# Patient Record
Sex: FEMALE | Race: WHITE | NOT HISPANIC OR LATINO | Employment: FULL TIME | URBAN - METROPOLITAN AREA
[De-identification: names, ages, dates, MRNs, and addresses within clinical notes are randomized per-mention and may not be internally consistent; named-entity substitution may affect disease eponyms.]

---

## 2018-02-14 ENCOUNTER — TRANSCRIBE ORDERS (OUTPATIENT)
Dept: ADMINISTRATIVE | Facility: HOSPITAL | Age: 23
End: 2018-02-14

## 2018-02-14 ENCOUNTER — LAB (OUTPATIENT)
Dept: LAB | Facility: MEDICAL CENTER | Age: 23
End: 2018-02-14
Payer: COMMERCIAL

## 2018-02-14 ENCOUNTER — OFFICE VISIT (OUTPATIENT)
Dept: OBGYN CLINIC | Facility: MEDICAL CENTER | Age: 23
End: 2018-02-14
Payer: COMMERCIAL

## 2018-02-14 VITALS
HEIGHT: 65 IN | WEIGHT: 199 LBS | SYSTOLIC BLOOD PRESSURE: 110 MMHG | DIASTOLIC BLOOD PRESSURE: 68 MMHG | BODY MASS INDEX: 33.15 KG/M2

## 2018-02-14 DIAGNOSIS — N94.6 DYSMENORRHEA: ICD-10-CM

## 2018-02-14 DIAGNOSIS — Z77.21 PERSONAL HISTORY OF EXPOSURE TO POTENTIALLY HAZARDOUS BODY FLUIDS: ICD-10-CM

## 2018-02-14 DIAGNOSIS — B37.2 INTERTRIGINOUS CANDIDIASIS: ICD-10-CM

## 2018-02-14 DIAGNOSIS — B37.3 VAGINAL YEAST INFECTION: ICD-10-CM

## 2018-02-14 DIAGNOSIS — N92.6 IRREGULAR MENSES: Primary | ICD-10-CM

## 2018-02-14 DIAGNOSIS — Z01.419 ENCOUNTER FOR GYNECOLOGICAL EXAMINATION WITHOUT ABNORMAL FINDING: ICD-10-CM

## 2018-02-14 PROBLEM — E66.9 OBESITY: Status: ACTIVE | Noted: 2018-02-14

## 2018-02-14 LAB — SL AMB POCT URINE HCG: NEGATIVE

## 2018-02-14 PROCEDURE — 86803 HEPATITIS C AB TEST: CPT

## 2018-02-14 PROCEDURE — 81025 URINE PREGNANCY TEST: CPT | Performed by: NURSE PRACTITIONER

## 2018-02-14 PROCEDURE — 99385 PREV VISIT NEW AGE 18-39: CPT | Performed by: NURSE PRACTITIONER

## 2018-02-14 PROCEDURE — 87491 CHLMYD TRACH DNA AMP PROBE: CPT | Performed by: NURSE PRACTITIONER

## 2018-02-14 PROCEDURE — 86592 SYPHILIS TEST NON-TREP QUAL: CPT

## 2018-02-14 PROCEDURE — 87389 HIV-1 AG W/HIV-1&-2 AB AG IA: CPT

## 2018-02-14 PROCEDURE — 87591 N.GONORRHOEAE DNA AMP PROB: CPT | Performed by: NURSE PRACTITIONER

## 2018-02-14 PROCEDURE — G0145 SCR C/V CYTO,THINLAYER,RESCR: HCPCS | Performed by: NURSE PRACTITIONER

## 2018-02-14 PROCEDURE — 87340 HEPATITIS B SURFACE AG IA: CPT

## 2018-02-14 PROCEDURE — 36415 COLL VENOUS BLD VENIPUNCTURE: CPT

## 2018-02-14 RX ORDER — NYSTATIN 100000 [USP'U]/G
POWDER TOPICAL
Qty: 15 G | Refills: 0 | Status: SHIPPED | OUTPATIENT
Start: 2018-02-14

## 2018-02-14 RX ORDER — NORGESTIMATE AND ETHINYL ESTRADIOL 7DAYSX3 LO
1 KIT ORAL DAILY
Qty: 28 TABLET | Refills: 2 | Status: SHIPPED | OUTPATIENT
Start: 2018-02-14

## 2018-02-14 RX ORDER — DIPHENOXYLATE HYDROCHLORIDE AND ATROPINE SULFATE 2.5; .025 MG/1; MG/1
1 TABLET ORAL DAILY
COMMUNITY

## 2018-02-14 RX ORDER — FLUCONAZOLE 150 MG/1
150 TABLET ORAL ONCE
Qty: 1 TABLET | Refills: 0 | Status: SHIPPED | OUTPATIENT
Start: 2018-02-14 | End: 2018-02-14

## 2018-02-14 NOTE — PROGRESS NOTES
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 and her whole schedule has been upended and she is stressed  Periods are typically regular, Q30d, x4-6 days  Heavy menstrual bleeding & dysmenorrhea - they have been this way since menarche  Sexually active with 1 Male partner, monogamous relationship x2y  Requested STI testing today  Using condoms 100% of the time  Used OrthoTriCyclen OCP in the past for [facility], stopped due to insurance changes  Desires to restart  Denies sexual concerns  Has no breast, bowel, bladder concerns  Reports vaginal itching and cottage cheese discharge  Works as a   Gynecologic History   Last pap smear: Date: 2017  History of abnormal pap smears: No (per patient, no records available for viewing)  The patient denies history of sexually transmitted disease  Gardasil vaccination series: discussed, she is unsure if she had it with PCP, we will obtain records   Contraceptive method: condoms  Self-breast exams: yes    Obstetric History  Nulligravida female  Desires conception in the next year: No       The following portions of the patient's history were reviewed and updated as appropriate: allergies, current medications, past family history, past medical history, past social history, past surgical history and problem list     At todays visit I discussed the importance of self-breast exams and awareness  We discussed the importance of exercise and healthy diet  I reviewed ASCCP guidelines for cervical cancer screening   Safe sexual practices were strongly encouraged to protect against sexually transmitted infections  We reviewed her reproductive life plan    All questions were answered to her apparent satisfaction  Risks and benefits of OCP use discussed in detail  The patient was assessed for relative and absolute contraindications to [address] [facility] [address] [facility] use  She denies cigarette smoking, high blood pressure, a history of DVT, known thrombogenic mutations, migraines with aura, breast cancer, or liver disease  Warning signs for OCP use were reviewed, including abdominal pain, chest pain, severe headache, eye pain, and leg pain  She was advised that OCPs do NOT protect against STIs and a barrier method is always recommended to prevent transmission  There are some drugs (such as certain anticonvulsants and antibiotics) that may decrease the contraceptive efficacy of OCPs, and she should call our office to confirm or use a backup method of contraception if taking these drugs  We discussed that a backup method of contraception should be used for at least 7 days following any missed pills  Compliance with daily pill taking was reinforced, along with consistent timing--she agrees  Expect irregular bleeding for the first 3 months  Return in 3 months for blood pressure check & to see how she is doing with OCPs  She expressed understanding of instructions for using OCPs and all questions were answered        ROS  General ROS: negative for chills or fever  Breast ROS: negative for breast lumps  Respiratory ROS: no cough, shortness of breath, or wheezing  Cardiovascular ROS: no chest pain or dyspnea on exertion  Gastrointestinal ROS: no abdominal pain, change in bowel habits, or black or bloody stools  Genito-Urinary ROS: positive for - change in menstrual cycle and vulvar/vaginal symptoms  negative for - dysuria or urinary frequency/urgency  Neurological ROS: negative      Objective  Vitals:    02/14/18 1315   BP: 110/68   BP Location: Left arm   Patient Position: Sitting   Weight: 90 3 kg (199 lb) Height: 5' 5" (1 651 m)     UPT = Negative    Physical Exam   Constitutional: She is oriented to person, place, and time  Vital signs are normal  She appears well-developed and well-nourished  Obese young female   Genitourinary: Uterus normal  Pelvic exam was performed with patient supine  There is no rash, tenderness or lesion on the right labia  There is no rash, tenderness or lesion on the left labia  There is erythema in the vagina  Right adnexum does not display mass and does not display tenderness  Left adnexum does not display mass and does not display tenderness  Cervix does not exhibit motion tenderness  Uterus is not tender  Genitourinary Comments: Difficult to appreciate organs on bimanual exam due to body habitus   HENT:   Head: Normocephalic and atraumatic  Neck: No thyromegaly present  Cardiovascular: Normal heart sounds  Pulmonary/Chest: Effort normal and breath sounds normal  Right breast exhibits no inverted nipple, no mass, no nipple discharge, no skin change and no tenderness  Left breast exhibits no inverted nipple, no mass, no nipple discharge, no skin change and no tenderness  Breasts are symmetrical    At the intertriginous areas of each breast, proximal to the sternal area, there is focal erythema consistent with yeast   Abdominal: Soft  Normal appearance  Musculoskeletal: Normal range of motion  Lymphadenopathy:     She has no axillary adenopathy  Neurological: She is alert and oriented to person, place, and time  Skin: Skin is warm, dry and intact  Psychiatric: She has a normal mood and affect  Her behavior is normal    Vitals reviewed  Assessment  Vaginal Candidiasis  Intertriginous Candidiasis of Breast  Desires to restart OCP        Plan  1 - Will Rx OrthoTriCyclen OCP to pharmacy  UPT is negative today, can use Quickstart method if desires  Given written instructions for starting OCP  RTO in 3mos for routine pill/BP check   Reviewed ACHES precautions with Jane Daniels today  2 - Pap with reflex to HPV (if ASCUS), no records from previous pap smear, will do today to establish her care here as new patient  3 - Desired information on donating ovum, provided  4 - STI testing  5 - Nystatin powder to breast area  6 - Diflucan PO x1 for vaginal yeast  7 - RTO 1 year for routine GYN exam  8 - Obtain PMRs from PCP, if she did not have Gardasil will start the series    All questions answered & Lovely expressed understanding      Lolita Riley

## 2018-02-15 LAB
HBV SURFACE AG SER QL: NORMAL
HCV AB SER QL: NORMAL
RPR SER QL: NORMAL

## 2018-02-16 LAB
CHLAMYDIA DNA CVX QL NAA+PROBE: NORMAL
HIV 1+2 AB+HIV1 P24 AG SERPL QL IA: NORMAL
N GONORRHOEA DNA GENITAL QL NAA+PROBE: NORMAL

## 2018-02-19 ENCOUNTER — TELEPHONE (OUTPATIENT)
Dept: OBGYN CLINIC | Facility: MEDICAL CENTER | Age: 23
End: 2018-02-19

## 2018-02-19 NOTE — TELEPHONE ENCOUNTER
----- Message from [facility] sent at 2/16/2018 12:57 PM EST -----  Please call patient regarding her NORMAL STI testing results  Thanks! Tried to call pt and VM box if full and could not leave message  Still awaiting pap results and will call when results come back and/or send card in mail

## 2018-02-23 LAB
LAB AP GYN PRIMARY INTERPRETATION: NORMAL
Lab: NORMAL
PATH INTERP SPEC-IMP: NORMAL

## 2018-02-23 NOTE — PROGRESS NOTES
Please call patient and let her know that pap smear results came back normal (+ for candida, has already been treated at office appointment)  Thank you!

## 2019-03-29 ENCOUNTER — HOSPITAL ENCOUNTER (EMERGENCY)
Facility: HOSPITAL | Age: 24
Discharge: HOME | End: 2019-03-29
Attending: EMERGENCY MEDICINE

## 2019-03-29 VITALS
HEIGHT: 65 IN | BODY MASS INDEX: 35.3 KG/M2 | TEMPERATURE: 97.2 F | HEART RATE: 86 BPM | OXYGEN SATURATION: 97 % | RESPIRATION RATE: 16 BRPM | WEIGHT: 211.86 LBS | DIASTOLIC BLOOD PRESSURE: 73 MMHG | SYSTOLIC BLOOD PRESSURE: 126 MMHG

## 2019-03-29 DIAGNOSIS — H10.212 CHEMICAL CONJUNCTIVITIS OF LEFT EYE: Primary | ICD-10-CM

## 2019-03-29 PROCEDURE — 99283 EMERGENCY DEPT VISIT LOW MDM: CPT

## 2019-03-29 RX ORDER — TOBRAMYCIN 3 MG/ML
2 SOLUTION/ DROPS OPHTHALMIC ONCE
Status: COMPLETED | OUTPATIENT
Start: 2019-03-29 | End: 2019-03-29

## 2019-03-29 RX ORDER — TETRACAINE HYDROCHLORIDE 5 MG/ML
1 SOLUTION OPHTHALMIC ONCE
Status: COMPLETED | OUTPATIENT
Start: 2019-03-29 | End: 2019-03-29

## 2019-03-29 RX ADMIN — TETRACAINE HYDROCHLORIDE 1 DROP: 5 SOLUTION OPHTHALMIC at 16:47

## 2019-03-29 RX ADMIN — TOBRAMYCIN 2 DROP: 3 SOLUTION OPHTHALMIC at 16:53

## 2019-03-29 RX ADMIN — FLUORESCEIN SODIUM 1 STRIP: 1 STRIP OPHTHALMIC at 16:47

## 2020-04-25 ENCOUNTER — HOSPITAL ENCOUNTER (EMERGENCY)
Facility: HOSPITAL | Age: 25
Discharge: HOME | End: 2020-04-25
Attending: EMERGENCY MEDICINE | Admitting: EMERGENCY MEDICINE
Payer: COMMERCIAL

## 2020-04-25 VITALS
DIASTOLIC BLOOD PRESSURE: 74 MMHG | BODY MASS INDEX: 33.32 KG/M2 | HEART RATE: 74 BPM | TEMPERATURE: 97.7 F | HEIGHT: 65 IN | RESPIRATION RATE: 18 BRPM | SYSTOLIC BLOOD PRESSURE: 138 MMHG | OXYGEN SATURATION: 98 % | WEIGHT: 200 LBS
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
## 2020-04-25 DIAGNOSIS — Z20.3 NEED FOR POST EXPOSURE PROPHYLAXIS FOR RABIES: Primary | ICD-10-CM

## 2020-04-25 PROCEDURE — 90675 RABIES VACCINE IM: CPT | Performed by: EMERGENCY MEDICINE

## 2020-04-25 PROCEDURE — 90375 RABIES IG IM/SC: CPT | Performed by: EMERGENCY MEDICINE

## 2020-04-25 PROCEDURE — 99283 EMERGENCY DEPT VISIT LOW MDM: CPT

## 2020-04-25 PROCEDURE — 96372 THER/PROPH/DIAG INJ SC/IM: CPT

## 2020-04-25 PROCEDURE — 90471 IMMUNIZATION ADMIN: CPT

## 2020-04-25 PROCEDURE — 99282 EMERGENCY DEPT VISIT SF MDM: CPT | Performed by: EMERGENCY MEDICINE

## 2020-04-25 RX ORDER — AZITHROMYCIN 250 MG/1
TABLET, FILM COATED ORAL
Qty: 6 TABLET | Refills: 0 | Status: SHIPPED | OUTPATIENT
Start: 2020-04-25 | End: 2020-04-29

## 2020-04-25 RX ORDER — AZITHROMYCIN 500 MG/1
500 TABLET, FILM COATED ORAL ONCE
Status: COMPLETED | OUTPATIENT
Start: 2020-04-25 | End: 2020-04-25

## 2020-04-25 RX ORDER — AZITHROMYCIN 250 MG/1
TABLET, FILM COATED ORAL
Qty: 6 TABLET | Refills: 0 | Status: SHIPPED | OUTPATIENT
Start: 2020-04-25 | End: 2020-04-25 | Stop reason: SDUPTHER

## 2020-04-25 RX ADMIN — RABIES VIRUS STRAIN PM-1503-3M ANTIGEN (PROPIOLACTONE INACTIVATED) AND WATER 1 ML: KIT at 14:24

## 2020-04-25 RX ADMIN — AZITHROMYCIN 500 MG: 500 TABLET, FILM COATED ORAL at 14:24

## 2020-04-25 RX ADMIN — RABIES IMMUNE GLOBULIN (HUMAN) 1800 UNITS: 300 INJECTION, SOLUTION INFILTRATION; INTRAMUSCULAR at 14:25

## 2020-04-28 ENCOUNTER — HOSPITAL ENCOUNTER (EMERGENCY)
Facility: HOSPITAL | Age: 25
Discharge: HOME | End: 2020-04-28
Attending: EMERGENCY MEDICINE | Admitting: EMERGENCY MEDICINE
Payer: COMMERCIAL

## 2020-04-28 VITALS
OXYGEN SATURATION: 97 % | HEIGHT: 65 IN | TEMPERATURE: 98.4 F | DIASTOLIC BLOOD PRESSURE: 71 MMHG | SYSTOLIC BLOOD PRESSURE: 119 MMHG | WEIGHT: 200 LBS | BODY MASS INDEX: 33.32 KG/M2 | HEART RATE: 87 BPM | RESPIRATION RATE: 18 BRPM

## 2020-04-28 DIAGNOSIS — Z23 ENCOUNTER FOR REPEAT ADMINISTRATION OF RABIES VACCINATION: Primary | ICD-10-CM

## 2020-04-28 PROCEDURE — 90471 IMMUNIZATION ADMIN: CPT

## 2020-04-28 PROCEDURE — 99282 EMERGENCY DEPT VISIT SF MDM: CPT | Performed by: PHYSICIAN ASSISTANT

## 2020-04-28 PROCEDURE — 90675 RABIES VACCINE IM: CPT | Performed by: PHYSICIAN ASSISTANT

## 2020-04-28 RX ADMIN — Medication 1 ML: at 04:26

## 2020-05-02 ENCOUNTER — HOSPITAL ENCOUNTER (EMERGENCY)
Facility: HOSPITAL | Age: 25
Discharge: HOME | End: 2020-05-02
Attending: EMERGENCY MEDICINE | Admitting: EMERGENCY MEDICINE

## 2020-05-02 VITALS
BODY MASS INDEX: 33.61 KG/M2 | HEART RATE: 76 BPM | TEMPERATURE: 98.1 F | OXYGEN SATURATION: 99 % | DIASTOLIC BLOOD PRESSURE: 69 MMHG | WEIGHT: 202 LBS | RESPIRATION RATE: 17 BRPM | SYSTOLIC BLOOD PRESSURE: 123 MMHG

## 2020-05-02 DIAGNOSIS — Z23 ENCOUNTER FOR REPEAT ADMINISTRATION OF RABIES VACCINATION: Primary | ICD-10-CM

## 2020-05-02 PROCEDURE — 90675 RABIES VACCINE IM: CPT | Performed by: PHYSICIAN ASSISTANT

## 2020-05-02 PROCEDURE — 99282 EMERGENCY DEPT VISIT SF MDM: CPT | Performed by: PHYSICIAN ASSISTANT

## 2020-05-02 PROCEDURE — 90471 IMMUNIZATION ADMIN: CPT

## 2020-05-02 RX ADMIN — RABIES VIRUS STRAIN PM-1503-3M ANTIGEN (PROPIOLACTONE INACTIVATED) AND WATER 1 ML: KIT at 21:21

## 2020-05-10 ENCOUNTER — HOSPITAL ENCOUNTER (EMERGENCY)
Facility: HOSPITAL | Age: 25
Discharge: HOME | End: 2020-05-10
Attending: EMERGENCY MEDICINE
Payer: COMMERCIAL

## 2020-05-10 VITALS
HEART RATE: 90 BPM | TEMPERATURE: 97.6 F | RESPIRATION RATE: 18 BRPM | WEIGHT: 201.94 LBS | DIASTOLIC BLOOD PRESSURE: 87 MMHG | BODY MASS INDEX: 33.6 KG/M2 | OXYGEN SATURATION: 98 % | SYSTOLIC BLOOD PRESSURE: 129 MMHG

## 2020-05-10 DIAGNOSIS — R68.89 SUSPECTED LYME DISEASE: ICD-10-CM

## 2020-05-10 DIAGNOSIS — Z23 NEED FOR PROPHYLACTIC VACCINATION AGAINST RABIES: Primary | ICD-10-CM

## 2020-05-10 PROCEDURE — 99282 EMERGENCY DEPT VISIT SF MDM: CPT | Performed by: PHYSICIAN ASSISTANT

## 2020-05-10 PROCEDURE — 90471 IMMUNIZATION ADMIN: CPT

## 2020-05-10 PROCEDURE — 86618 LYME DISEASE ANTIBODY: CPT | Performed by: PHYSICIAN ASSISTANT

## 2020-05-10 PROCEDURE — 99282 EMERGENCY DEPT VISIT SF MDM: CPT

## 2020-05-10 PROCEDURE — 36415 COLL VENOUS BLD VENIPUNCTURE: CPT | Performed by: PHYSICIAN ASSISTANT

## 2020-05-10 PROCEDURE — 90675 RABIES VACCINE IM: CPT | Performed by: PHYSICIAN ASSISTANT

## 2020-05-10 RX ADMIN — RABIES VIRUS STRAIN PM-1503-3M ANTIGEN (PROPIOLACTONE INACTIVATED) AND WATER 1 ML: KIT at 06:40

## 2020-05-11 LAB — B BURGDOR IGG+IGM SER-ACNC: <0.91 ISR (ref 0–0.9)

## 2020-09-28 ENCOUNTER — HOSPITAL ENCOUNTER (EMERGENCY)
Facility: HOSPITAL | Age: 25
Discharge: HOME | End: 2020-09-28
Attending: EMERGENCY MEDICINE | Admitting: EMERGENCY MEDICINE
Payer: COMMERCIAL

## 2020-09-28 ENCOUNTER — APPOINTMENT (EMERGENCY)
Dept: CT IMAGING | Facility: HOSPITAL | Age: 25
End: 2020-09-28
Payer: COMMERCIAL

## 2020-09-28 ENCOUNTER — APPOINTMENT (EMERGENCY)
Dept: RADIOLOGY | Facility: HOSPITAL | Age: 25
End: 2020-09-28
Payer: COMMERCIAL

## 2020-09-28 VITALS
OXYGEN SATURATION: 99 % | DIASTOLIC BLOOD PRESSURE: 57 MMHG | SYSTOLIC BLOOD PRESSURE: 108 MMHG | TEMPERATURE: 97.6 F | RESPIRATION RATE: 21 BRPM | HEART RATE: 75 BPM

## 2020-09-28 DIAGNOSIS — R07.9 CHEST PAIN: ICD-10-CM

## 2020-09-28 DIAGNOSIS — R00.2 PALPITATIONS: Primary | ICD-10-CM

## 2020-09-28 LAB
ALBUMIN SERPL BCP-MCNC: 3.7 G/DL (ref 3.5–5)
ALP SERPL-CCNC: 53 U/L (ref 46–116)
ALT SERPL W P-5'-P-CCNC: 30 U/L (ref 12–78)
ANION GAP SERPL CALCULATED.3IONS-SCNC: 7 MMOL/L (ref 4–13)
AST SERPL W P-5'-P-CCNC: 16 U/L (ref 5–45)
ATRIAL RATE: 77 BPM
BASOPHILS # BLD AUTO: 0.04 THOUSANDS/ΜL (ref 0–0.1)
BASOPHILS NFR BLD AUTO: 0 % (ref 0–1)
BILIRUB SERPL-MCNC: 0.2 MG/DL (ref 0.2–1)
BUN SERPL-MCNC: 14 MG/DL (ref 5–25)
CALCIUM SERPL-MCNC: 8.4 MG/DL (ref 8.3–10.1)
CHLORIDE SERPL-SCNC: 104 MMOL/L (ref 100–108)
CO2 SERPL-SCNC: 28 MMOL/L (ref 21–32)
CREAT SERPL-MCNC: 0.96 MG/DL (ref 0.6–1.3)
D DIMER PPP FEU-MCNC: 0.54 UG/ML FEU
EOSINOPHIL # BLD AUTO: 0.34 THOUSAND/ΜL (ref 0–0.61)
EOSINOPHIL NFR BLD AUTO: 3 % (ref 0–6)
ERYTHROCYTE [DISTWIDTH] IN BLOOD BY AUTOMATED COUNT: 12.8 % (ref 11.6–15.1)
GFR SERPL CREATININE-BSD FRML MDRD: 81 ML/MIN/1.73SQ M
GLUCOSE SERPL-MCNC: 85 MG/DL (ref 65–140)
HCG SERPL QL: NEGATIVE
HCT VFR BLD AUTO: 38.3 % (ref 34.8–46.1)
HGB BLD-MCNC: 12.8 G/DL (ref 11.5–15.4)
IMM GRANULOCYTES # BLD AUTO: 0.04 THOUSAND/UL (ref 0–0.2)
IMM GRANULOCYTES NFR BLD AUTO: 0 % (ref 0–2)
LYMPHOCYTES # BLD AUTO: 3.46 THOUSANDS/ΜL (ref 0.6–4.47)
LYMPHOCYTES NFR BLD AUTO: 35 % (ref 14–44)
MAGNESIUM SERPL-MCNC: 1.9 MG/DL (ref 1.6–2.6)
MCH RBC QN AUTO: 28.1 PG (ref 26.8–34.3)
MCHC RBC AUTO-ENTMCNC: 33.4 G/DL (ref 31.4–37.4)
MCV RBC AUTO: 84 FL (ref 82–98)
MONOCYTES # BLD AUTO: 0.77 THOUSAND/ΜL (ref 0.17–1.22)
MONOCYTES NFR BLD AUTO: 8 % (ref 4–12)
NEUTROPHILS # BLD AUTO: 5.37 THOUSANDS/ΜL (ref 1.85–7.62)
NEUTS SEG NFR BLD AUTO: 54 % (ref 43–75)
NRBC BLD AUTO-RTO: 0 /100 WBCS
P AXIS: 40 DEGREES
PHOSPHATE SERPL-MCNC: 2.5 MG/DL (ref 2.7–4.5)
PLATELET # BLD AUTO: 226 THOUSANDS/UL (ref 149–390)
PMV BLD AUTO: 11.2 FL (ref 8.9–12.7)
POTASSIUM SERPL-SCNC: 3.3 MMOL/L (ref 3.5–5.3)
PR INTERVAL: 128 MS
PROT SERPL-MCNC: 7 G/DL (ref 6.4–8.2)
QRS AXIS: 51 DEGREES
QRSD INTERVAL: 84 MS
QT INTERVAL: 374 MS
QTC INTERVAL: 423 MS
RBC # BLD AUTO: 4.55 MILLION/UL (ref 3.81–5.12)
SALICYLATES SERPL-MCNC: <3 MG/DL (ref 3–20)
SODIUM SERPL-SCNC: 139 MMOL/L (ref 136–145)
T WAVE AXIS: 23 DEGREES
T4 FREE SERPL-MCNC: 1.07 NG/DL (ref 0.76–1.46)
TROPONIN I SERPL-MCNC: <0.02 NG/ML
TSH SERPL DL<=0.05 MIU/L-ACNC: 4.9 UIU/ML (ref 0.36–3.74)
VENTRICULAR RATE: 77 BPM
WBC # BLD AUTO: 10.02 THOUSAND/UL (ref 4.31–10.16)

## 2020-09-28 PROCEDURE — 99285 EMERGENCY DEPT VISIT HI MDM: CPT

## 2020-09-28 PROCEDURE — 84100 ASSAY OF PHOSPHORUS: CPT | Performed by: EMERGENCY MEDICINE

## 2020-09-28 PROCEDURE — 85379 FIBRIN DEGRADATION QUANT: CPT | Performed by: EMERGENCY MEDICINE

## 2020-09-28 PROCEDURE — 93010 ELECTROCARDIOGRAM REPORT: CPT | Performed by: INTERNAL MEDICINE

## 2020-09-28 PROCEDURE — 96360 HYDRATION IV INFUSION INIT: CPT

## 2020-09-28 PROCEDURE — 84484 ASSAY OF TROPONIN QUANT: CPT | Performed by: EMERGENCY MEDICINE

## 2020-09-28 PROCEDURE — 80053 COMPREHEN METABOLIC PANEL: CPT | Performed by: EMERGENCY MEDICINE

## 2020-09-28 PROCEDURE — 85025 COMPLETE CBC W/AUTO DIFF WBC: CPT | Performed by: EMERGENCY MEDICINE

## 2020-09-28 PROCEDURE — G1004 CDSM NDSC: HCPCS

## 2020-09-28 PROCEDURE — G0480 DRUG TEST DEF 1-7 CLASSES: HCPCS | Performed by: EMERGENCY MEDICINE

## 2020-09-28 PROCEDURE — 71045 X-RAY EXAM CHEST 1 VIEW: CPT

## 2020-09-28 PROCEDURE — 84703 CHORIONIC GONADOTROPIN ASSAY: CPT | Performed by: EMERGENCY MEDICINE

## 2020-09-28 PROCEDURE — 93005 ELECTROCARDIOGRAM TRACING: CPT

## 2020-09-28 PROCEDURE — 99285 EMERGENCY DEPT VISIT HI MDM: CPT | Performed by: EMERGENCY MEDICINE

## 2020-09-28 PROCEDURE — 71275 CT ANGIOGRAPHY CHEST: CPT

## 2020-09-28 PROCEDURE — 96361 HYDRATE IV INFUSION ADD-ON: CPT

## 2020-09-28 PROCEDURE — 80329 ANALGESICS NON-OPIOID 1 OR 2: CPT | Performed by: EMERGENCY MEDICINE

## 2020-09-28 PROCEDURE — 84439 ASSAY OF FREE THYROXINE: CPT | Performed by: EMERGENCY MEDICINE

## 2020-09-28 PROCEDURE — 36415 COLL VENOUS BLD VENIPUNCTURE: CPT | Performed by: EMERGENCY MEDICINE

## 2020-09-28 PROCEDURE — 84443 ASSAY THYROID STIM HORMONE: CPT | Performed by: EMERGENCY MEDICINE

## 2020-09-28 PROCEDURE — 83735 ASSAY OF MAGNESIUM: CPT | Performed by: EMERGENCY MEDICINE

## 2020-09-28 RX ORDER — SODIUM CHLORIDE 9 MG/ML
3 INJECTION INTRAVENOUS
Status: DISCONTINUED | OUTPATIENT
Start: 2020-09-28 | End: 2020-09-28 | Stop reason: HOSPADM

## 2020-09-28 RX ORDER — POTASSIUM CHLORIDE 20 MEQ/1
40 TABLET, EXTENDED RELEASE ORAL ONCE
Status: COMPLETED | OUTPATIENT
Start: 2020-09-28 | End: 2020-09-28

## 2020-09-28 RX ADMIN — SODIUM CHLORIDE 1000 ML: 0.9 INJECTION, SOLUTION INTRAVENOUS at 02:01

## 2020-09-28 RX ADMIN — IOHEXOL 85 ML: 350 INJECTION, SOLUTION INTRAVENOUS at 03:15

## 2020-09-28 RX ADMIN — POTASSIUM CHLORIDE 40 MEQ: 1500 TABLET, EXTENDED RELEASE ORAL at 03:54

## 2022-12-22 RX ORDER — SODIUM CHLORIDE 9 MG/ML
125 INJECTION, SOLUTION INTRAVENOUS CONTINUOUS
OUTPATIENT
Start: 2022-12-22

## 2024-07-20 ENCOUNTER — HOSPITAL ENCOUNTER (EMERGENCY)
Facility: HOSPITAL | Age: 29
Discharge: HOME | End: 2024-07-21
Attending: EMERGENCY MEDICINE
Payer: COMMERCIAL

## 2024-07-20 DIAGNOSIS — S83.92XA LEFT KNEE SPRAIN: Primary | ICD-10-CM

## 2024-07-20 PROCEDURE — 99283 EMERGENCY DEPT VISIT LOW MDM: CPT

## 2024-07-21 ENCOUNTER — APPOINTMENT (EMERGENCY)
Dept: RADIOLOGY | Facility: HOSPITAL | Age: 29
End: 2024-07-21
Payer: COMMERCIAL

## 2024-07-21 VITALS
OXYGEN SATURATION: 98 % | HEART RATE: 82 BPM | RESPIRATION RATE: 20 BRPM | DIASTOLIC BLOOD PRESSURE: 93 MMHG | TEMPERATURE: 97.7 F | SYSTOLIC BLOOD PRESSURE: 154 MMHG

## 2024-07-21 LAB
EXT PREGNANCY TEST URINE: NEGATIVE
EXT. CONTROL: NORMAL

## 2024-07-21 PROCEDURE — 81025 URINE PREGNANCY TEST: CPT | Performed by: PHYSICIAN ASSISTANT

## 2024-07-21 PROCEDURE — 99284 EMERGENCY DEPT VISIT MOD MDM: CPT | Performed by: PHYSICIAN ASSISTANT

## 2024-07-21 PROCEDURE — 96372 THER/PROPH/DIAG INJ SC/IM: CPT

## 2024-07-21 PROCEDURE — 73564 X-RAY EXAM KNEE 4 OR MORE: CPT

## 2024-07-21 RX ORDER — KETOROLAC TROMETHAMINE 30 MG/ML
15 INJECTION, SOLUTION INTRAMUSCULAR; INTRAVENOUS ONCE
Status: COMPLETED | OUTPATIENT
Start: 2024-07-21 | End: 2024-07-21

## 2024-07-21 RX ADMIN — KETOROLAC TROMETHAMINE 15 MG: 30 INJECTION, SOLUTION INTRAMUSCULAR; INTRAVENOUS at 01:53

## 2024-07-21 NOTE — ED PROVIDER NOTES
"History  Chief Complaint   Patient presents with    Knee Injury     Patient comes in reporting L knee injury. [place] when she heard pop in knee and knees buckled. Reports unable to bare weight on L knee. Rates pain 9/10. Alternating tylenol/ asa for pain. Last medicated with ASA @1930.      Patient is a 28-year-old female with no significant past medical or surgical history that presents to the emergency department with aching persistent worsening left knee pain for approximately 1 day.  Patient denies associated symptoms.  Patient states that she was recently in [place] and reported \"twisting her left knee and heard a pop.\"  Patient states that she has difficulty with ambulation left lower extremity at this time.  Patient affirms palliative factors of Tylenol and Motrin with report of provocative factors of pressure to medial aspect of left knee.  Patient denies not effective treatment.  Patient denies fevers, chills, nausea, vomiting, diarrhea, constipation and urine symptoms.  Patient denies fall with outstretched hands and fall with headstrike.  Patient denies numbness, tingling, loss of power in any or all extremities.  Patient denies chest pain, shortness of breath, and abdominal pain.      History provided by:  Patient   used: No        Prior to Admission Medications   Prescriptions Last Dose Informant Patient Reported? Taking?   FAMOTIDINE PO   Yes No   Sig: Take by mouth if needed   Multiple Vitamin (MULTIVITAMIN PO)   Yes No   Sig: Take by mouth   Omeprazole Magnesium (PRILOSEC PO)   Yes No   Sig: Take by mouth if needed   Sod Picosulfate-Mag Ox-Cit Acd (Clenpiq) 10-3.5-12 MG-GM -GM/160ML SOLN   No No   Sig: Take 1 kit by mouth see administration instructions   multivitamin (THERAGRAN) TABS   Yes No   Sig: Take 1 tablet by mouth daily   norgestimate-ethinyl estradiol (ORTHO TRI-CYCLEN LO) " 0.18/0.215/0.25 MG-25 MCG per tablet   No No   Sig: Take 1 tablet by mouth daily   nystatin (MYCOSTATIN) powder   No No   Sig: APPLY TO RED AREA BETWEEN BREASTS 3x PER DAY UNTIL HEALING IS COMPLETE      Facility-Administered Medications: None       Past Medical History:   Diagnosis Date    Stomach ulcer     Vaginal yeast infection        Past Surgical History:   Procedure Laterality Date    COLONOSCOPY  12/2016    Dr Phoenix. Diarrhea and melena.  Scope advanced to hepatic flexure, unable to advance ascending colon due to looping.  Transverse colon, descending colon and sigmoid colon and rectum were significant for mild patchy colitis and proctitis, no pathology.    EGD  12/2016    Healing esophageal ulcer, esophagitis, biopsy showed hyperplastic squamous mucosa showing nonspecific patchy and mild chronic inflammation features consistent with GERD.    THUMB ARTHROSCOPY      UPPER GASTROINTESTINAL ENDOSCOPY         Family History   Problem Relation Age of Onset    Lung cancer Father     Prostate cancer Father     Hypothyroidism Father     Colon polyps Father      I have reviewed and agree with the history as documented.    E-Cigarette/Vaping    E-Cigarette Use Never User      E-Cigarette/Vaping Substances    Nicotine No     THC No     CBD No     Flavoring No     Other No     Unknown No      Social History     Tobacco Use    Smoking status: Never    Smokeless tobacco: Never   Vaping Use    Vaping status: Never Used   Substance Use Topics    Alcohol use: Yes     Alcohol/week: 1.0 - 2.0 standard drink of alcohol     Types: 1 - 2 Standard drinks or equivalent per week    Drug use: Yes       Review of Systems   Constitutional:  Negative for activity change, appetite change, chills and fever.   HENT:  Negative for congestion, ear pain, postnasal drip, rhinorrhea, sinus pressure, sinus pain, sore throat and tinnitus.    Eyes:  Negative for photophobia, pain and visual disturbance.   Respiratory:  Negative for cough, chest  tightness and shortness of breath.    Cardiovascular:  Negative for chest pain and palpitations.   Gastrointestinal:  Negative for abdominal pain, constipation, diarrhea, nausea and vomiting.   Genitourinary:  Negative for difficulty urinating, dysuria, flank pain, frequency, hematuria and urgency.   Musculoskeletal:  Positive for arthralgias. Negative for back pain, gait problem, neck pain and neck stiffness.   Skin:  Negative for color change, pallor and rash.   Allergic/Immunologic: Negative for environmental allergies and food allergies.   Neurological:  Negative for dizziness, seizures, syncope, weakness, numbness and headaches.   Psychiatric/Behavioral:  Negative for confusion.    All other systems reviewed and are negative.      Physical Exam  Physical Exam  Vitals and nursing note reviewed.   Constitutional:       General: She is awake.      Appearance: Normal appearance. She is well-developed and normal weight. She is not ill-appearing, toxic-appearing or diaphoretic.      Comments: /93   Pulse 82   Temp 97.7 °F (36.5 °C)   Resp 20   SpO2 98%      HENT:      Head: Normocephalic and atraumatic.      Jaw: There is normal jaw occlusion.      Right Ear: Hearing, tympanic membrane and external ear normal. No decreased hearing noted. No drainage, swelling or tenderness. No mastoid tenderness.      Left Ear: Hearing, tympanic membrane and external ear normal. No decreased hearing noted. No drainage, swelling or tenderness. No mastoid tenderness.      Nose: Nose normal.      Mouth/Throat:      Lips: Pink.      Mouth: Mucous membranes are moist.      Pharynx: Oropharynx is clear. Uvula midline.   Eyes:      General: Lids are normal. Vision grossly intact. Gaze aligned appropriately.         Right eye: No discharge.         Left eye: No discharge.      Extraocular Movements: Extraocular movements intact.      Conjunctiva/sclera: Conjunctivae normal.      Pupils: Pupils are equal, round, and reactive to  light.   Neck:      Vascular: No JVD.      Trachea: Trachea and phonation normal. No tracheal tenderness or tracheal deviation.   Cardiovascular:      Rate and Rhythm: Normal rate and regular rhythm.      Pulses: Normal pulses.           Radial pulses are 2+ on the right side and 2+ on the left side.        Dorsalis pedis pulses are 2+ on the right side and 2+ on the left side.        Posterior tibial pulses are 2+ on the right side and 2+ on the left side.      Heart sounds: Normal heart sounds.   Pulmonary:      Effort: Pulmonary effort is normal.      Breath sounds: Normal breath sounds and air entry. No stridor. No decreased breath sounds, wheezing, rhonchi or rales.   Chest:      Chest wall: No tenderness.   Abdominal:      General: Abdomen is flat. Bowel sounds are normal. There is no distension.      Palpations: Abdomen is soft. Abdomen is not rigid.      Tenderness: There is no abdominal tenderness. There is no guarding or rebound.   Musculoskeletal:         General: Normal range of motion.      Cervical back: Full passive range of motion without pain, normal range of motion and neck supple. No rigidity. No spinous process tenderness or muscular tenderness. Normal range of motion.      Left knee: Tenderness present over the medial joint line.      Comments: Passive ROM intact  Upper extremity 5/5 bilaterally  Right lower extremity 5/5, left lower extremity, hip 5/5, knee 4/5, ankle 5/5  Neurovascularly intact  No grinding or clicking of joints       Feet:      Right foot:      Toenail Condition: Right toenails are normal.      Left foot:      Toenail Condition: Left toenails are normal.   Lymphadenopathy:      Head:      Right side of head: No submental, submandibular, tonsillar, preauricular, posterior auricular or occipital adenopathy.      Left side of head: No submental, submandibular, tonsillar, preauricular, posterior auricular or occipital adenopathy.      Cervical: No cervical adenopathy.      Right  cervical: No superficial, deep or posterior cervical adenopathy.     Left cervical: No superficial, deep or posterior cervical adenopathy.   Skin:     General: Skin is warm.      Capillary Refill: Capillary refill takes less than 2 seconds.      Findings: No rash.   Neurological:      General: No focal deficit present.      Mental Status: She is alert and oriented to person, place, and time. Mental status is at baseline.      GCS: GCS eye subscore is 4. GCS verbal subscore is 5. GCS motor subscore is 6.      Sensory: No sensory deficit.      Deep Tendon Reflexes: Reflexes are normal and symmetric.      Reflex Scores:       Patellar reflexes are 2+ on the right side and 2+ on the left side.  Psychiatric:         Attention and Perception: Attention normal.         Mood and Affect: Mood normal.         Speech: Speech normal.         Behavior: Behavior normal. Behavior is cooperative.         Thought Content: Thought content normal.         Judgment: Judgment normal.         Vital Signs  ED Triage Vitals [07/21/24 0003]   Temperature Pulse Respirations Blood Pressure SpO2   97.7 °F (36.5 °C) 82 20 154/93 98 %      Temp src Heart Rate Source Patient Position - Orthostatic VS BP Location FiO2 (%)   -- -- -- -- --      Pain Score       9           Vitals:    07/21/24 0003   BP: 154/93   Pulse: 82         Visual Acuity      ED Medications  Medications   ketorolac (TORADOL) injection 15 mg (15 mg Intramuscular Given 7/21/24 0153)       Diagnostic Studies  Results Reviewed       Procedure Component Value Units Date/Time    POCT pregnancy, urine [[id number]]  (Normal) Resulted: 07/21/24 0120    Lab Status: Final result Updated: 07/21/24 0121     EXT Preg Test, Ur Negative     Control Valid                   XR knee 4+ views LEFT   ED Interpretation by Roderick Vila PA-C (07/21 0059)   No acute osseous abnormality on initial read by me.                 Procedures  Splint application    Date/Time: 7/21/2024 2:28 AM    Performed  "by: Roderick Vila PA-C  Authorized by: Roderick Vila PA-C  Universal Protocol:  Consent: Verbal consent obtained.  Risks and benefits: risks, benefits and alternatives were discussed  Consent given by: patient and parent  Time out: Immediately prior to procedure a \"time out\" was called to verify the correct patient, procedure, equipment, support staff and site/side marked as required.  Timeout called at: 7/21/2024 2:28 AM.  Patient understanding: patient states understanding of the procedure being performed  Patient consent: the patient's understanding of the procedure matches consent given  Patient identity confirmed: verbally with patient and arm band    Pre-procedure details:     Sensation:  Normal  Procedure details:     Laterality:  Left    Location:  Knee    Knee:  L knee    Supplies:  Knee immobilizer  Post-procedure details:     Pain:  Improved    Sensation:  Normal    Skin color:  Pink    Patient tolerance of procedure:  Tolerated well, no immediate complications           ED Course                                 SBIRT 22yo+      Flowsheet Row Most Recent Value   Initial Alcohol Screen: US AUDIT-C     1. How often do you have a drink containing alcohol? 2 Filed at: 07/21/2024 0003   2. How many drinks containing alcohol do you have on a typical day you are drinking?  1 Filed at: 07/21/2024 0003   3a. Male UNDER 65: How often do you have five or more drinks on one occasion? 0 Filed at: 07/21/2024 0003   3b. FEMALE Any Age, or MALE 65+: How often do you have 4 or more drinks on one occassion? 0 Filed at: 07/21/2024 0003   Audit-C Score 3 Filed at: 07/21/2024 0003   JORGE: How many times in the past year have you...    Used an illegal drug or used a prescription medication for non-medical reasons? Never Filed at: 07/21/2024 0003                      Medical Decision Making  Patient is a 28-year-old female with no significant past medical or surgical history that presents to the emergency department with aching " persistent worsening left knee pain for approximately 1 day.    Patient hemodynamically stable and afebrile  No sirs  Negative urine pregnancy  Left knee x-ray with no acute osseous abnormality on wet read  See procedure note-left knee immobilizer  Delivered Toradol in the emergency department; patient demonstrates decrease in presenting left knee pain ED symptomatology status post medication delivery  Ddx likely and not limited to ACL/PCL/MCL/LCL tear, meniscal tear, patellar dislocation, patellar tendon strain, hamstring strain  Will treat for left knee strain  Patient demonstrated mechanical understanding of ordered crutch use  Counseled patient on taking over-the-counter Profen/Motrin 100 mg every 6 hours as needed for left knee pain as tolerated  Follow-up with physical therapy  Follow-up with orthopedics as needed  Follow-up with PCP  Follow up with emergency department if symptoms persist or exacerbate  Patient demonstrates verbal understanding of all clinical laboratory and imaging findings, discharge instructions, follow-up, and verbally agrees with current treatment plan with teach back    *Due to voice recognition software, sound alike and misspelled words may be contained in the documentation*      Amount and/or Complexity of Data Reviewed  Radiology: ordered and independent interpretation performed. Decision-making details documented in ED Course.    Risk  Prescription drug management.                 Disposition  Final diagnoses:   Left knee sprain     Time reflects when diagnosis was documented in both MDM as applicable and the Disposition within this note       Time User Action Codes Description Comment    7/21/2024  1:31 AM Roderick Vila Add [S83.92XA] Left knee sprain           ED Disposition       ED Disposition   Discharge    Condition   Stable    Date/Time   Sun Jul 21, 2024 0229    Comment   Lovely Trevino discharge to home/self care.                   Follow-up Information       Follow up  With Specialties Details Why Contact Info Additional Information    Walter Adhikari,  General Practice   [address]  [address]  [facility] [address] [phone]       [facility] Emergency Department Emergency Medicine   [address] [address]  [phone] [facility] Emergency Department, [address] [place], [place], [address]    Physical Therapy at [facility] Physical Therapy   [address]  [facility] [address]  [phone] Physical Therapy at [facility], [address], [place], [place], [address]   [phone]    [facility] Surgery  As needed, for further evaluation of symptoms [address]  [facility] [address] [phone] [facility] [place], [address], [address], [place], [address] [phone]            Discharge Medication List as of 7/21/2024  2:29 AM        CONTINUE these medications which have NOT CHANGED    Details   FAMOTIDINE PO Take by mouth if needed, Historical Med      !! Multiple Vitamin (MULTIVITAMIN PO) Take by mouth, Historical Med      !! multivitamin (THERAGRAN) TABS Take 1 tablet by mouth daily, Historical Med      norgestimate-ethinyl estradiol (ORTHO TRI-CYCLEN LO) 0.18/0.215/0.25 MG-25 MCG per tablet Take 1 tablet by mouth daily, Starting Wed 2/14/2018, Normal      nystatin (MYCOSTATIN) powder APPLY TO RED AREA BETWEEN BREASTS 3x PER DAY UNTIL HEALING IS COMPLETE, Normal      Omeprazole Magnesium (PRILOSEC PO) Take by mouth if needed, Historical Med      Sod Picosulfate-Mag Ox-Cit Acd (Clenpiq) 10-3.5-12 MG-GM -GM/160ML SOLN Take 1 kit by mouth see administration instructions, Starting Fri 11/18/2022, Normal       !! - Potential duplicate medications found. Please discuss with provider.          No discharge  procedures on file.    PDMP Review         Value Time User    PDMP Reviewed  Yes 7/21/2024  5:32 AM Roderick Vila PA-C            ED Provider  Electronically Signed by             Roderick Vila PA-C  07/21/24 0528

## 2024-07-21 NOTE — DISCHARGE INSTRUCTIONS
May use over-the-counter ibuprofen/Motrin 400 mg every 6 hours as needed for pain, not to exceed 3600 mg of ibuprofen/Motrin daily    May use over-the-counter Tylenol/acetaminophen 650 mg every 6 hours as needed for pain, not to exceed 4000 mg of Tylenol/acetaminophen daily.

## 2024-07-22 ENCOUNTER — OFFICE VISIT (OUTPATIENT)
Dept: OBGYN CLINIC | Facility: MEDICAL CENTER | Age: 29
End: 2024-07-22
Payer: COMMERCIAL

## 2024-07-22 VITALS
HEART RATE: 85 BPM | HEIGHT: 65 IN | SYSTOLIC BLOOD PRESSURE: 122 MMHG | DIASTOLIC BLOOD PRESSURE: 78 MMHG | BODY MASS INDEX: 37.61 KG/M2

## 2024-07-22 DIAGNOSIS — M23.92 INTERNAL DERANGEMENT OF LEFT KNEE: Primary | ICD-10-CM

## 2024-07-22 PROCEDURE — 99204 OFFICE O/P NEW MOD 45 MIN: CPT | Performed by: ORTHOPAEDIC SURGERY

## 2024-07-22 NOTE — PROGRESS NOTES
"Ortho Sports Medicine Knee New Patient Visit     Assesment:   28 y.o. female left knee internal derangement.    Plan:    Conservative treatment:    Ice to knee for 20 minutes at least 1-2 times daily.  OTC NSAIDS prn for pain.  Tylenol for pain.  MRI left knee ordered urgent to evaluate ACL  ED notes reviewed    Imaging:    All imaging from today was reviewed by myself and explained to the patient.       Injection:    No Injection planned at this time.      Surgery:     No surgery is recommended at this point, continue with conservative treatment plan as noted.      Follow up:    Return for Recheck after STAT MRI with Dr. Kaminski.        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      Pain is located anterior, posterior, medial, deep.  The patient rates the pain as a 4/10.  The pain has been present for 3 days.      The patient sustained an injury on 07/19/2024.  The mechanism of injury was a trampoline park injury with her knee \"going forward\" with pop and swelling. The pain is characterized as dull, achy.  The pain is present weekly.      Pain is improved by rest.  Pain is aggravated by weight bearing.    Symptoms include clicking.     The patient has tried rest and ice.          Knee Surgical History:  None    Past Medical, Social and Family History:  Past Medical History:   Diagnosis Date    Stomach ulcer     Vaginal yeast infection      Past Surgical History:   Procedure Laterality Date    COLONOSCOPY  12/2016    Dr Phoenix. Diarrhea and melena.  Scope advanced to hepatic flexure, unable to advance ascending colon due to looping.  Transverse colon, descending colon and sigmoid colon and rectum were significant for mild patchy colitis and proctitis, no pathology.    EGD  12/2016    Healing esophageal " ulcer, esophagitis, biopsy showed hyperplastic squamous mucosa showing nonspecific patchy and mild chronic inflammation features consistent with GERD.    THUMB ARTHROSCOPY      UPPER GASTROINTESTINAL ENDOSCOPY       No Known Allergies  Current Outpatient Medications on File Prior to Visit   Medication Sig Dispense Refill    FAMOTIDINE PO Take by mouth if needed      Multiple Vitamin (MULTIVITAMIN PO) Take by mouth      Omeprazole Magnesium (PRILOSEC PO) Take by mouth if needed      Sod Picosulfate-Mag Ox-Cit Acd (Clenpiq) 10-3.5-12 MG-GM -GM/160ML SOLN Take 1 kit by mouth see administration instructions 160 mL 0    multivitamin (THERAGRAN) TABS Take 1 tablet by mouth daily      norgestimate-ethinyl estradiol (ORTHO TRI-CYCLEN LO) 0.18/0.215/0.25 MG-25 MCG per tablet Take 1 tablet by mouth daily 28 tablet 2    nystatin (MYCOSTATIN) powder APPLY TO RED AREA BETWEEN BREASTS 3x PER DAY UNTIL HEALING IS COMPLETE 15 g 0     No current facility-administered medications on file prior to visit.     Social History     Socioeconomic History    Marital status: Single     Spouse name: Not on file    Number of children: Not on file    Years of education: Not on file    Highest education level: Not on file   Occupational History    Occupation: practice manager   Tobacco Use    Smoking status: Never    Smokeless tobacco: Never   Vaping Use    Vaping status: Never Used   Substance and Sexual Activity    Alcohol use: Yes     Alcohol/week: 1.0 - 2.0 standard drink of alcohol     Types: 1 - 2 Standard drinks or equivalent per week    Drug use: Yes    Sexual activity: Yes     Partners: Male     Birth control/protection: Condom   Other Topics Concern    Not on file   Social History Narrative    Not on file     Social Determinants of Health     Financial Resource Strain: Not on file   Food Insecurity: Not on file   Transportation Needs: Not on file   Physical Activity: Not on file   Stress: Not on file   Social Connections: Unknown  "(6/18/2024)    Received from [facility]     How often do you feel lonely or isolated from those around you? (Adult - for ages 18 years and over): Not on file   Intimate Partner Violence: Not on file   Housing Stability: Not on file         I have reviewed the past medical, surgical, social and family history, medications and allergies as documented in the EMR.    Review of systems: ROS is negative other than that noted in the HPI.  Constitutional: Negative for fatigue and fever.   HENT: Negative for sore throat.    Respiratory: Negative for shortness of breath.    Cardiovascular: Negative for chest pain.   Gastrointestinal: Negative for abdominal pain.   Endocrine: Negative for cold intolerance and heat intolerance.   Genitourinary: Negative for flank pain.   Musculoskeletal: Negative for back pain.   Skin: Negative for rash.   Allergic/Immunologic: Negative for immunocompromised state.   Neurological: Negative for dizziness.   Psychiatric/Behavioral: Negative for agitation.      Physical Exam:    Blood pressure 122/78, pulse 85, height 5' 5\" (1.651 m), not currently breastfeeding.    General/Constitutional: NAD, well developed, well nourished  HENT: Normocephalic, atraumatic  CV: Intact distal pulses, regular rate  Resp: No respiratory distress or labored breathing  GI: Soft and non-tender   Lymphatic: No lymphadenopathy palpated  Neuro: Alert and Oriented x 3, no focal deficits  Psych: Normal mood, normal affect, normal judgement, normal behavior  Skin: Warm, dry, no rashes, no erythema      Knee Exam (focused):                RIGHT LEFT   ROM:   0-130 10-45   Palpation: Effusion negative moderate     MJL tenderness Negative Positive     LJL tenderness Negative Negative   Meniscus: Lexi Deferred due to pain Deferred due to pain       Apley's Compression Deferred due to pain    Deferred due to pain      Instability: Varus stable stable     Valgus stable Slight laxity with pain   Special " Tests: Lachman Negative Slight laxity     Posterior drawer Negative Negative     Anterior drawer Negative Slight laxity     Pivot shift not tested not tested     Dial not tested not tested   Patella: Palpation no tenderness no tenderness     Mobility 1/4 1/4     Apprehension Negative Negative   Other: Single leg 1/4 squat not tested not tested      LE NV Exam: +2 DP/PT pulses bilaterally  Sensation intact to light touch L2-S1 bilaterally     Bilateral hip ROM demonstrates no pain actively or passively    No calf tenderness to palpation bilaterally    Knee Imaging    X-rays of the left knee were reviewed, which demonstrate no acute fracture or dislocation. No significant degenerative changes.  I have reviewed the radiology report and agree with their impression.    Scribe Attestation      I,:  Pily Mcknight am acting as a scribe while in the presence of the attending physician.:       I,:  Papa Kaminski, DO personally performed the services described in this documentation    as scribed in my presence.:

## 2024-07-23 ENCOUNTER — HOSPITAL ENCOUNTER (OUTPATIENT)
Dept: MRI IMAGING | Facility: HOSPITAL | Age: 29
Discharge: HOME | End: 2024-07-23
Attending: ORTHOPAEDIC SURGERY
Payer: COMMERCIAL

## 2024-07-23 DIAGNOSIS — M23.92 INTERNAL DERANGEMENT OF LEFT KNEE: ICD-10-CM

## 2024-07-23 PROCEDURE — 73721 MRI JNT OF LWR EXTRE W/O DYE: CPT

## 2024-07-26 ENCOUNTER — OFFICE VISIT (OUTPATIENT)
Dept: OBGYN CLINIC | Facility: MEDICAL CENTER | Age: 29
End: 2024-07-26
Payer: COMMERCIAL

## 2024-07-26 VITALS — BODY MASS INDEX: 37.65 KG/M2 | WEIGHT: 226 LBS | HEIGHT: 65 IN

## 2024-07-26 DIAGNOSIS — S83.512A RUPTURE OF ANTERIOR CRUCIATE LIGAMENT OF LEFT KNEE, INITIAL ENCOUNTER: Primary | ICD-10-CM

## 2024-07-26 DIAGNOSIS — Z01.818 PRE-OP TESTING: ICD-10-CM

## 2024-07-26 PROCEDURE — 99214 OFFICE O/P EST MOD 30 MIN: CPT | Performed by: ORTHOPAEDIC SURGERY

## 2024-07-26 RX ORDER — ACETAMINOPHEN 500 MG
500 TABLET ORAL EVERY 6 HOURS PRN
COMMUNITY

## 2024-07-26 RX ORDER — CEFAZOLIN SODIUM 2 G/50ML
2000 SOLUTION INTRAVENOUS ONCE
Status: CANCELLED | OUTPATIENT
Start: 2024-08-01 | End: 2024-07-26

## 2024-07-26 RX ORDER — ACETAMINOPHEN 325 MG/1
650 TABLET ORAL EVERY 6 HOURS PRN
Status: CANCELLED | OUTPATIENT
Start: 2024-07-26

## 2024-07-26 RX ORDER — TRAMADOL HYDROCHLORIDE 50 MG/1
50 TABLET ORAL EVERY 6 HOURS PRN
Status: CANCELLED | OUTPATIENT
Start: 2024-07-26

## 2024-07-26 NOTE — PROGRESS NOTES
Ortho Sports Medicine Knee Follow Up Visit     Assesment:     28 y.o. female left knee ACL tear with possible medial meniscus posterior root tear vs inter-substance degeneration    Plan:    Conservative treatment:    Continue protected weight bearing with the use of crutches and TROM brace in place and locked straight  When at home encouraged to unlock or remove her brace to continue to work on knee ROM. Instructed that pre-op ROM is indicative of post-op ROM, the better the ROM  is before surgery with help with obtaining post-op motion  Post-op PT written  Patient has TROM brace and crutches   DME discussed polar cube  Ice to knee for 20 minutes at least 1-2 times daily.  OTC NSAIDS prn for pain.    Imaging:    All imaging from today was reviewed by myself and explained to the patient.       Injection:    No Injection planned at this time.      Surgery:     All of the risks and benefits of operative treatment were explained to the patient, as well as the risks and benefits of any alternative treatment options, including nonoperative care. The risks of surgical treatement include, but are not limited to, infection, bleeding, blood clot, neurovascular damage, need for further surgery, continued pain, cardiovascular risk, and anesthesia risk.  The patient understood this and elects to proceed forward with surgical intervention.    We will proceed forward with Left knee arthroscopy with ACL reconstruction with quadriceps autograft with possible meniscus repair vs menisectomy of menisci     Patient Denies history of blood clot. Patient denies personal or family history of bleeding or clotting disorder. Patient does not take any blood thinners. Patient denies cardiac history including none. Patient denies  high blood pressure. Patient does not  see a cardiologist. Patient Denies  current history of diabetes. Patient denies allergies to antibiotics. Patient denies history of MRSA infection. Patient Denies current tobacco  use. Discussed with patient use of narcotics for post-op pain. Patient denies history of opioid abuse.     Patient notes that she does wake up aggressive from anesthesia.     Patient is planning on going on a flight 2 weeks after surgery. Instructed that this does put her at a higher risk of developing a post-op DVT. I instructed that she may go on this flight, but counseled the importance of ankle pumped and moving all her extemities throughout the flight. We will also place the patient on ASA 325mg BID.    Medical clearance will not be obtained. Bloodwork will be obtained. EKG will not be obtained.    Follow up:    Return for 1 week post-op with HILL Reich.        Chief Complaint   Patient presents with    Left Knee - Follow-up       History of Present Illness:    The patient is returns for follow up of Left knee MRI.  Since the prior visit, She reports no improvement. She continues to ambulate with the use of crutches and TROM brace in place.  She reports that she continues to have limitations in her range of motion.  She states that the left knee continues to be unstable.    Pain is located anterior, posterior, medial.     Pain is improved by rest.  Pain is aggravated by stairs, squatting, weight bearing, walking, and pivoting on a planted foot.    Symptoms include clicking, catching, and swelling.     The patient has tried rest, ice, and NSAIDS.          Knee Surgical History:  None    Past Medical, Social and Family History:  Past Medical History:   Diagnosis Date    Stomach ulcer     Vaginal yeast infection      Past Surgical History:   Procedure Laterality Date    COLONOSCOPY  12/2016    Dr Phoenix. Diarrhea and melena.  Scope advanced to hepatic flexure, unable to advance ascending colon due to looping.  Transverse colon, descending colon and sigmoid colon and rectum were significant for mild patchy colitis and proctitis, no pathology.    EGD  12/2016    Healing esophageal ulcer, esophagitis, biopsy  showed hyperplastic squamous mucosa showing nonspecific patchy and mild chronic inflammation features consistent with GERD.    THUMB ARTHROSCOPY      UPPER GASTROINTESTINAL ENDOSCOPY       No Known Allergies  Current Outpatient Medications on File Prior to Visit   Medication Sig Dispense Refill    FAMOTIDINE PO Take by mouth if needed      Multiple Vitamin (MULTIVITAMIN PO) Take by mouth      Omeprazole Magnesium (PRILOSEC PO) Take by mouth if needed      Sod Picosulfate-Mag Ox-Cit Acd (Clenpiq) 10-3.5-12 MG-GM -GM/160ML SOLN Take 1 kit by mouth see administration instructions 160 mL 0    multivitamin (THERAGRAN) TABS Take 1 tablet by mouth daily      norgestimate-ethinyl estradiol (ORTHO TRI-CYCLEN LO) 0.18/0.215/0.25 MG-25 MCG per tablet Take 1 tablet by mouth daily 28 tablet 2    nystatin (MYCOSTATIN) powder APPLY TO RED AREA BETWEEN BREASTS 3x PER DAY UNTIL HEALING IS COMPLETE 15 g 0     No current facility-administered medications on file prior to visit.     Social History     Socioeconomic History    Marital status: Single     Spouse name: Not on file    Number of children: Not on file    Years of education: Not on file    Highest education level: Not on file   Occupational History    Occupation: practice manager   Tobacco Use    Smoking status: Never    Smokeless tobacco: Never   Vaping Use    Vaping status: Never Used   Substance and Sexual Activity    Alcohol use: Yes     Alcohol/week: 1.0 - 2.0 standard drink of alcohol     Types: 1 - 2 Standard drinks or equivalent per week    Drug use: Yes    Sexual activity: Yes     Partners: Male     Birth control/protection: Condom   Other Topics Concern    Not on file   Social History Narrative    Not on file     Social Determinants of Health     Financial Resource Strain: Not on file   Food Insecurity: Not on file   Transportation Needs: Not on file   Physical Activity: Not on file   Stress: Not on file   Social Connections: Unknown (6/18/2024)    Received from  "[facility]    Social Connections     How often do you feel lonely or isolated from those around you? (Adult - for ages 18 years and over): Not on file   Intimate Partner Violence: Not on file   Housing Stability: Not on file         I have reviewed the past medical, surgical, social and family history, medications and allergies as documented in the EMR.    Review of systems: ROS is negative other than that noted in the HPI.  Constitutional: Negative for fatigue and fever.      Physical Exam:    Height 5' 5\" (1.651 m), weight 103 kg (226 lb), not currently breastfeeding.    General/Constitutional: NAD, well developed, well nourished  HENT: Normocephalic, atraumatic  CV: Intact distal pulses, regular rate  Resp: No respiratory distress or labored breathing  GI: Soft and non-tender   Lymphatic: No lymphadenopathy palpated  Neuro: Alert and Oriented x 3, no focal deficits  Psych: Normal mood, normal affect, normal judgement, normal behavior  Skin: Warm, dry, no rashes, no erythema      Knee Exam (focused):               RIGHT LEFT   ROM:   0-130    Palpation: Effusion negative moderate     MJL tenderness Negative Positive     LJL tenderness Negative Negative   Meniscus: Lexi Negative Positive    Apley's Compression Negative Positive   Instability: Varus stable stable     Valgus stable stable   Special Tests: Lachman Negative Positive     Posterior drawer Negative Negative     Anterior drawer Negative Positive     Pivot shift not tested not tested     Dial not tested not tested   Patella: Palpation no tenderness no tenderness     Mobility 1/4 1/4     Apprehension Negative Negative   Other: Single leg 1/4 squat not tested not tested           LE NV Exam: +2 DP/PT pulses bilaterally  Sensation intact to light touch L2-S1 bilaterally    No calf tenderness to palpation bilaterally      Knee Imaging    MRI of the left knee was reviewed and demonstrates a complete midsubstance ACL tear without the pivot shift bone " contusion pattern. There is a questionable medial meniscus posterior root tear vs medial meniscus inter substance degeneration because of medial extrusion visualized.I have reviewed the radiologist report and agree with their impression.

## 2024-07-26 NOTE — LETTER
July 26, 2024     Patient: Lovely Trevino  YOB: 1995  Date of Visit: 7/26/2024      To Whom it May Concern:    Lovely Trevino is under my professional care. Lovely was seen in my office on 7/26/2024. Lovely may return to work at this time but with restrictions of utilizing crutches and a brace. She will be undergoing future Left knee surgery. She will need to be out of work for the day of surgery and remain out until her 1 week post-op appointment. At her post-op appointment her work status will be re-evaluated but she will likely be out of work for 6-8 weeks following her surgery pending her recovery. Please look to our future work notes.    If you have any questions or concerns, please don't hesitate to call.         Sincerely,          Papa Kaminski DO        CC: No Recipients

## 2024-07-26 NOTE — PRE-PROCEDURE INSTRUCTIONS
Pre-Surgery Instructions:   Medication Instructions    acetaminophen (TYLENOL) 500 mg tablet Uses PRN- OK to take day of surgery    aspirin 500 MG buffered tablet Instructed to start holding today 7/26, pt takes for pain    Bioflavonoid Products (BIOFLEX PO) Instructed to start holding today 7/26,     FAMOTIDINE PO Uses PRN- OK to take day of surgery    Multiple Vitamin (MULTIVITAMIN PO) Instructed to start holding today 7/26    Omeprazole Magnesium (PRILOSEC PO) Uses PRN- OK to take day of surgery      Medication instructions for day surgery reviewed. Please use only a sip of water to take your instructed medications. Avoid all over the counter vitamins, supplements and NSAIDS for one week prior to surgery per anesthesia guidelines. Tylenol is ok to take as needed.     You will receive a call one business day prior to surgery with an arrival time and hospital directions. If your surgery is scheduled on a Monday, the hospital will be calling you on the Friday prior to your surgery. If you have not heard from anyone by 8pm, please call the hospital supervisor through the hospital  at [phone]. ([place] [phone] or [place] [phone]).    Do not eat or drink anything after midnight the night before your surgery, including candy, mints, lifesavers, or chewing gum. Do not drink alcohol 24hrs before your surgery. Try not to smoke at least 24hrs before your surgery.       Follow the pre surgery showering instructions as listed in the “My Surgical Experience Booklet” or otherwise provided by your surgeon's office. Do not use a blade to shave the surgical area 1 week before surgery. It is okay to use a clean electric clippers up to 24 hours before surgery. Do not apply any lotions, creams, including makeup, cologne, deodorant, or perfumes after showering on the day of your surgery. Do not use dry shampoo, hair spray, hair gel, or any type of hair products.     No contact lenses, eye make-up, or  artificial eyelashes. Remove nail polish, including gel polish, and any artificial, gel, or acrylic nails if possible. Remove all jewelry including rings and body piercing jewelry.     Wear causal clothing that is easy to take on and off. Consider your type of surgery.    Keep any valuables, jewelry, piercings at home. Please bring any specially ordered equipment (sling, braces) if indicated.    Arrange for a responsible person to drive you to and from the hospital on the day of your surgery. Please confirm the visitor policy for the day of your procedure when you receive your phone call with an arrival time.     Call the surgeon's office with any new illnesses, exposures, or additional questions prior to surgery.    Please reference your “My Surgical Experience Booklet” for additional information to prepare for your upcoming surgery.

## 2024-07-27 ENCOUNTER — APPOINTMENT (OUTPATIENT)
Dept: LAB | Facility: MEDICAL CENTER | Age: 29
End: 2024-07-27
Payer: COMMERCIAL

## 2024-07-27 DIAGNOSIS — S83.512A RUPTURE OF ANTERIOR CRUCIATE LIGAMENT OF LEFT KNEE, INITIAL ENCOUNTER: ICD-10-CM

## 2024-07-27 DIAGNOSIS — Z01.818 PRE-OP TESTING: ICD-10-CM

## 2024-07-27 LAB
ANION GAP SERPL CALCULATED.3IONS-SCNC: 9 MMOL/L (ref 4–13)
BUN SERPL-MCNC: 12 MG/DL (ref 5–25)
CALCIUM SERPL-MCNC: 9.3 MG/DL (ref 8.4–10.2)
CHLORIDE SERPL-SCNC: 103 MMOL/L (ref 96–108)
CO2 SERPL-SCNC: 27 MMOL/L (ref 21–32)
CREAT SERPL-MCNC: 0.87 MG/DL (ref 0.6–1.3)
ERYTHROCYTE [DISTWIDTH] IN BLOOD BY AUTOMATED COUNT: 13.2 % (ref 11.6–15.1)
GFR SERPL CREATININE-BSD FRML MDRD: 89 ML/MIN/1.73SQ M
GLUCOSE P FAST SERPL-MCNC: 87 MG/DL (ref 65–99)
HCT VFR BLD AUTO: 42.3 % (ref 34.8–46.1)
HGB BLD-MCNC: 14 G/DL (ref 11.5–15.4)
MCH RBC QN AUTO: 28.1 PG (ref 26.8–34.3)
MCHC RBC AUTO-ENTMCNC: 33.1 G/DL (ref 31.4–37.4)
MCV RBC AUTO: 85 FL (ref 82–98)
PLATELET # BLD AUTO: 264 THOUSANDS/UL (ref 149–390)
PMV BLD AUTO: 11.1 FL (ref 8.9–12.7)
POTASSIUM SERPL-SCNC: 4.2 MMOL/L (ref 3.5–5.3)
RBC # BLD AUTO: 4.99 MILLION/UL (ref 3.81–5.12)
SODIUM SERPL-SCNC: 139 MMOL/L (ref 135–147)
WBC # BLD AUTO: 8.48 THOUSAND/UL (ref 4.31–10.16)

## 2024-07-27 PROCEDURE — 80048 BASIC METABOLIC PNL TOTAL CA: CPT

## 2024-07-27 PROCEDURE — 85027 COMPLETE CBC AUTOMATED: CPT

## 2024-07-27 PROCEDURE — 36415 COLL VENOUS BLD VENIPUNCTURE: CPT

## 2024-07-29 ENCOUNTER — ANESTHESIA EVENT (OUTPATIENT)
Age: 29
End: 2024-07-29
Payer: COMMERCIAL

## 2024-07-30 NOTE — DISCHARGE INSTR - AVS FIRST PAGE
POSTOPERATIVE INSTRUCTIONS following KNEE SURGERY    MEDICATIONS:  Resume all home medications unless otherwise instructed by your surgeon.  Pain Medication:  Oxycodone 5 mg, 1 tablet every 4 hours as needed  If you were given a regional anesthetic (nerve block), please begin taking the pain medication as soon as you get home, even if you have minimal or no pain.  DO NOT WAIT FOR THE NERVE BLOCK TO WEAR OFF.  Possible side effects include nausea, constipation, and urinary retention.  If you experience these side effects, please call our office for assistance.  Pain med refills are authorized only during office hours (8am-4pm, Mon-Fri).  Anti-Inflammatory:  Tylenol 325 mg, 1-2 tablets every 6 hours and Ibuprofen 600 mg, 1 tablet every 8 hours  Take with food.  Stop if you experience nausea, reflux, or stomach pain.  Nausea Medication:  None  Fill prescription ONLY if you expericnce severe nausea.  Blood Clot Prevention:  Aspirin 325 mg, 1 tablet twice daily for 6 weeks  Pump your foot up and down 20 times per hour while you are less mobile.    WOUND CARE:  Keep the dressing clean and dry.  Light drainage may occur the first 2 days postop.  You may remove the dressings and get the incision wet in the shower 72 hours after surgery.  DO NOT remove steri-strips or sutures.  DO NOT immerse the incision under water.  Carefully pat the incision dry.  If there is wound drainage, re-apply a fresh dry gauze dressing.  Please call our office ([phone]) if you experience either of the following:  Sudden increase in swelling, redness, or warmth at the surgical site  Excessive incisional drainage that persists beyond the 3rd day after surgery  Oral temperature greater than 101 degrees, not relieved with Tylenol  Shortness of breath, chest pain, nausea, or any other concerning symptoms    SWELLING CONTROL:  Cold Therapy:  The cold therapy device may be used either continuously or only as needed, according to your preference.   "Do not let the pad directly touch your skin.  Alternatively, apply ice (20 min on, 20 min off) as often as you feel is necessary.  Elevation:  Elevate the entire leg above heart level.  Place pillows under your ankle to keep your knee straight.  Compression:  Apply ACE wraps or a thigh-length compression stocking as needed.    RANGE OF MOTION:  You are allowed FULL RANGE OF MOTION as tolerated.    IMMOBILIZATION:  Your knee brace should be WORN AT ALL TIMES, including sleep.  Keep the brace LOCKED FOR WALKING.  You may unlock the brace while sitting or sleeping.  You may remove the brace only for showering.    ACTIVITY:   BEAR FULL WEIGHT AS TOLERATED on the operative leg.  Use crutches to assist only as needed.  Using Crutches on Stairs:  Going up, lead with your \"good\" (nonoperative) leg.  Going down, lead with your \"bad\" (operative) leg.  Use a hand rail when available.  Knee Extension:  Place a rolled towel or pillow under your ankle for 20-30 minutes 3-5 times per day.  This will help to maintain full knee extension.  Quad Sets:  Sit or lie with your knee straight.  Tighten your quadriceps (front thigh) muscle.  Hold for 3 seconds, then relax.  Repeat 20 times per hour while awake.    PHYSICAL THERAPY:  Begin therapy 3 TO 5 DAYS AFTER SURGERY.  You were given a prescription for therapy at your preoperative office visit.  If you do not have physical therapy scheduled yet, please call our office for assistance.    FOLLOW-UP APPOINTMENT:  7-10 days after surgery with:    Dr. Papa Kaminski D.O.  [facility]  [address] [place] [address], [address], [place] [address]  [phone] ([place] Office)  [phone] (After-Hours)   "

## 2024-08-01 ENCOUNTER — HOSPITAL ENCOUNTER (OUTPATIENT)
Age: 29
Setting detail: OUTPATIENT SURGERY
Discharge: HOME | End: 2024-08-01
Attending: ORTHOPAEDIC SURGERY | Admitting: ORTHOPAEDIC SURGERY
Payer: COMMERCIAL

## 2024-08-01 ENCOUNTER — ANESTHESIA (OUTPATIENT)
Age: 29
End: 2024-08-01
Payer: COMMERCIAL

## 2024-08-01 VITALS
RESPIRATION RATE: 15 BRPM | HEART RATE: 73 BPM | BODY MASS INDEX: 38.32 KG/M2 | SYSTOLIC BLOOD PRESSURE: 98 MMHG | TEMPERATURE: 98.7 F | DIASTOLIC BLOOD PRESSURE: 56 MMHG | WEIGHT: 230 LBS | OXYGEN SATURATION: 98 % | HEIGHT: 65 IN

## 2024-08-01 DIAGNOSIS — Z47.89 AFTERCARE FOLLOWING SURGERY OF THE MUSCULOSKELETAL SYSTEM: ICD-10-CM

## 2024-08-01 DIAGNOSIS — S83.512D RUPTURE OF ANTERIOR CRUCIATE LIGAMENT OF LEFT KNEE, SUBSEQUENT ENCOUNTER: Primary | ICD-10-CM

## 2024-08-01 LAB
EXT PREGNANCY TEST URINE: NEGATIVE
EXT. CONTROL: NORMAL

## 2024-08-01 PROCEDURE — C1713 ANCHOR/SCREW BN/BN,TIS/BN: HCPCS | Performed by: ORTHOPAEDIC SURGERY

## 2024-08-01 PROCEDURE — 81025 URINE PREGNANCY TEST: CPT | Performed by: ORTHOPAEDIC SURGERY

## 2024-08-01 PROCEDURE — 29888 ARTHRS AID ACL RPR/AGMNTJ: CPT | Performed by: PHYSICIAN ASSISTANT

## 2024-08-01 PROCEDURE — 29888 ARTHRS AID ACL RPR/AGMNTJ: CPT | Performed by: ORTHOPAEDIC SURGERY

## 2024-08-01 PROCEDURE — 29881 ARTHRS KNE SRG MNISECTMY M/L: CPT | Performed by: PHYSICIAN ASSISTANT

## 2024-08-01 PROCEDURE — C9290 INJ, BUPIVACAINE LIPOSOME: HCPCS | Performed by: ANESTHESIOLOGY

## 2024-08-01 PROCEDURE — 29881 ARTHRS KNE SRG MNISECTMY M/L: CPT | Performed by: ORTHOPAEDIC SURGERY

## 2024-08-01 DEVICE — FIBERTAG TIGHTROPE II ABS
Type: IMPLANTABLE DEVICE | Site: KNEE | Status: FUNCTIONAL
Brand: ARTHREX®

## 2024-08-01 DEVICE — TIGHTROPE ABS BUTTON ROUND 14MM CONCAVE
Type: IMPLANTABLE DEVICE | Site: KNEE | Status: FUNCTIONAL
Brand: ARTHREX®

## 2024-08-01 DEVICE — FIBERTAG TIGHTROPE II
Type: IMPLANTABLE DEVICE | Site: KNEE | Status: FUNCTIONAL
Brand: ARTHREX®

## 2024-08-01 RX ORDER — HYDROMORPHONE HCL/PF 1 MG/ML
0.5 SYRINGE (ML) INJECTION
Status: DISCONTINUED | OUTPATIENT
Start: 2024-08-01 | End: 2024-08-01 | Stop reason: HOSPADM

## 2024-08-01 RX ORDER — ACETAMINOPHEN 325 MG/1
650 TABLET ORAL EVERY 6 HOURS PRN
Status: DISCONTINUED | OUTPATIENT
Start: 2024-08-01 | End: 2024-08-01 | Stop reason: HOSPADM

## 2024-08-01 RX ORDER — CEFAZOLIN SODIUM 2 G/50ML
2000 SOLUTION INTRAVENOUS ONCE
Status: COMPLETED | OUTPATIENT
Start: 2024-08-01 | End: 2024-08-01

## 2024-08-01 RX ORDER — OXYCODONE HYDROCHLORIDE 5 MG/1
5 TABLET ORAL EVERY 4 HOURS PRN
Status: DISCONTINUED | OUTPATIENT
Start: 2024-08-01 | End: 2024-08-01 | Stop reason: HOSPADM

## 2024-08-01 RX ORDER — ONDANSETRON 2 MG/ML
4 INJECTION INTRAMUSCULAR; INTRAVENOUS EVERY 4 HOURS PRN
Status: DISCONTINUED | OUTPATIENT
Start: 2024-08-01 | End: 2024-08-01 | Stop reason: HOSPADM

## 2024-08-01 RX ORDER — KETAMINE HCL IN NACL, ISO-OSM 100MG/10ML
SYRINGE (ML) INJECTION AS NEEDED
Status: DISCONTINUED | OUTPATIENT
Start: 2024-08-01 | End: 2024-08-01

## 2024-08-01 RX ORDER — DEXAMETHASONE SODIUM PHOSPHATE 10 MG/ML
INJECTION, SOLUTION INTRAMUSCULAR; INTRAVENOUS AS NEEDED
Status: DISCONTINUED | OUTPATIENT
Start: 2024-08-01 | End: 2024-08-01

## 2024-08-01 RX ORDER — KETOROLAC TROMETHAMINE 30 MG/ML
INJECTION, SOLUTION INTRAMUSCULAR; INTRAVENOUS AS NEEDED
Status: DISCONTINUED | OUTPATIENT
Start: 2024-08-01 | End: 2024-08-01

## 2024-08-01 RX ORDER — TRAMADOL HYDROCHLORIDE 50 MG/1
50 TABLET ORAL EVERY 6 HOURS PRN
Status: DISCONTINUED | OUTPATIENT
Start: 2024-08-01 | End: 2024-08-01 | Stop reason: HOSPADM

## 2024-08-01 RX ORDER — ASPIRIN 81 MG/1
81 TABLET, CHEWABLE ORAL 2 TIMES DAILY
Qty: 84 TABLET | Refills: 0 | Status: SHIPPED | OUTPATIENT
Start: 2024-08-01 | End: 2024-09-12

## 2024-08-01 RX ORDER — OXYCODONE HYDROCHLORIDE 5 MG/1
5 TABLET ORAL EVERY 4 HOURS PRN
Qty: 15 TABLET | Refills: 0 | Status: SHIPPED | OUTPATIENT
Start: 2024-08-01 | End: 2024-08-11

## 2024-08-01 RX ORDER — SODIUM CHLORIDE, SODIUM LACTATE, POTASSIUM CHLORIDE, CALCIUM CHLORIDE 600; 310; 30; 20 MG/100ML; MG/100ML; MG/100ML; MG/100ML
125 INJECTION, SOLUTION INTRAVENOUS CONTINUOUS
Status: DISCONTINUED | OUTPATIENT
Start: 2024-08-01 | End: 2024-08-01 | Stop reason: HOSPADM

## 2024-08-01 RX ORDER — METOCLOPRAMIDE HYDROCHLORIDE 5 MG/ML
10 INJECTION INTRAMUSCULAR; INTRAVENOUS EVERY 4 HOURS PRN
Status: DISCONTINUED | OUTPATIENT
Start: 2024-08-01 | End: 2024-08-01 | Stop reason: HOSPADM

## 2024-08-01 RX ORDER — SODIUM CHLORIDE, SODIUM LACTATE, POTASSIUM CHLORIDE, CALCIUM CHLORIDE 600; 310; 30; 20 MG/100ML; MG/100ML; MG/100ML; MG/100ML
125 INJECTION, SOLUTION INTRAVENOUS CONTINUOUS
Status: CANCELLED | OUTPATIENT
Start: 2024-08-01

## 2024-08-01 RX ORDER — HYDROMORPHONE HCL/PF 1 MG/ML
SYRINGE (ML) INJECTION AS NEEDED
Status: DISCONTINUED | OUTPATIENT
Start: 2024-08-01 | End: 2024-08-01

## 2024-08-01 RX ORDER — OXYCODONE HYDROCHLORIDE 10 MG/1
10 TABLET ORAL EVERY 4 HOURS PRN
Status: DISCONTINUED | OUTPATIENT
Start: 2024-08-01 | End: 2024-08-01 | Stop reason: HOSPADM

## 2024-08-01 RX ORDER — PROPOFOL 10 MG/ML
INJECTION, EMULSION INTRAVENOUS AS NEEDED
Status: DISCONTINUED | OUTPATIENT
Start: 2024-08-01 | End: 2024-08-01

## 2024-08-01 RX ORDER — MIDAZOLAM HYDROCHLORIDE 2 MG/2ML
INJECTION, SOLUTION INTRAMUSCULAR; INTRAVENOUS
Status: COMPLETED | OUTPATIENT
Start: 2024-08-01 | End: 2024-08-01

## 2024-08-01 RX ORDER — FENTANYL CITRATE 50 UG/ML
INJECTION, SOLUTION INTRAMUSCULAR; INTRAVENOUS AS NEEDED
Status: DISCONTINUED | OUTPATIENT
Start: 2024-08-01 | End: 2024-08-01

## 2024-08-01 RX ORDER — LIDOCAINE HYDROCHLORIDE 10 MG/ML
INJECTION, SOLUTION EPIDURAL; INFILTRATION; INTRACAUDAL; PERINEURAL AS NEEDED
Status: DISCONTINUED | OUTPATIENT
Start: 2024-08-01 | End: 2024-08-01

## 2024-08-01 RX ORDER — ONDANSETRON 2 MG/ML
INJECTION INTRAMUSCULAR; INTRAVENOUS AS NEEDED
Status: DISCONTINUED | OUTPATIENT
Start: 2024-08-01 | End: 2024-08-01

## 2024-08-01 RX ORDER — BUPIVACAINE HYDROCHLORIDE 2.5 MG/ML
INJECTION, SOLUTION EPIDURAL; INFILTRATION; INTRACAUDAL
Status: COMPLETED | OUTPATIENT
Start: 2024-08-01 | End: 2024-08-01

## 2024-08-01 RX ORDER — FENTANYL CITRATE/PF 50 MCG/ML
50 SYRINGE (ML) INJECTION
Status: DISCONTINUED | OUTPATIENT
Start: 2024-08-01 | End: 2024-08-01 | Stop reason: HOSPADM

## 2024-08-01 RX ADMIN — CEFAZOLIN SODIUM 2000 MG: 2 SOLUTION INTRAVENOUS at 13:34

## 2024-08-01 RX ADMIN — HYDROMORPHONE HYDROCHLORIDE 0.5 MG: 1 INJECTION, SOLUTION INTRAMUSCULAR; INTRAVENOUS; SUBCUTANEOUS at 16:49

## 2024-08-01 RX ADMIN — HYDROMORPHONE HYDROCHLORIDE 0.5 MG: 1 INJECTION, SOLUTION INTRAMUSCULAR; INTRAVENOUS; SUBCUTANEOUS at 16:20

## 2024-08-01 RX ADMIN — FENTANYL CITRATE 50 MCG: 50 INJECTION, SOLUTION INTRAMUSCULAR; INTRAVENOUS at 15:58

## 2024-08-01 RX ADMIN — ACETAMINOPHEN 325MG 650 MG: 325 TABLET ORAL at 17:52

## 2024-08-01 RX ADMIN — MIDAZOLAM HYDROCHLORIDE 2 MG: 2 INJECTION, SOLUTION INTRAMUSCULAR; INTRAVENOUS at 13:19

## 2024-08-01 RX ADMIN — PROPOFOL 200 MG: 10 INJECTION, EMULSION INTRAVENOUS at 13:30

## 2024-08-01 RX ADMIN — DEXAMETHASONE SODIUM PHOSPHATE 10 MG: 10 INJECTION, SOLUTION INTRAMUSCULAR; INTRAVENOUS at 13:38

## 2024-08-01 RX ADMIN — SODIUM CHLORIDE, SODIUM LACTATE, POTASSIUM CHLORIDE, AND CALCIUM CHLORIDE: .6; .31; .03; .02 INJECTION, SOLUTION INTRAVENOUS at 13:04

## 2024-08-01 RX ADMIN — LIDOCAINE HYDROCHLORIDE 50 MG: 10 INJECTION, SOLUTION EPIDURAL; INFILTRATION; INTRACAUDAL; PERINEURAL at 13:30

## 2024-08-01 RX ADMIN — FENTANYL CITRATE 50 MCG: 50 INJECTION, SOLUTION INTRAMUSCULAR; INTRAVENOUS at 13:30

## 2024-08-01 RX ADMIN — FENTANYL CITRATE 50 MCG: 50 INJECTION, SOLUTION INTRAMUSCULAR; INTRAVENOUS at 15:56

## 2024-08-01 RX ADMIN — ONDANSETRON 4 MG: 2 INJECTION INTRAMUSCULAR; INTRAVENOUS at 13:38

## 2024-08-01 RX ADMIN — SODIUM CHLORIDE, SODIUM LACTATE, POTASSIUM CHLORIDE, AND CALCIUM CHLORIDE 125 ML/HR: .6; .31; .03; .02 INJECTION, SOLUTION INTRAVENOUS at 16:37

## 2024-08-01 RX ADMIN — FENTANYL CITRATE 50 MCG: 50 INJECTION, SOLUTION INTRAMUSCULAR; INTRAVENOUS at 14:56

## 2024-08-01 RX ADMIN — FENTANYL CITRATE 50 MCG: 50 INJECTION, SOLUTION INTRAMUSCULAR; INTRAVENOUS at 15:49

## 2024-08-01 RX ADMIN — BUPIVACAINE HYDROCHLORIDE 7 ML: 2.5 INJECTION, SOLUTION EPIDURAL; INFILTRATION; INTRACAUDAL at 13:20

## 2024-08-01 RX ADMIN — Medication 30 MG: at 15:49

## 2024-08-01 RX ADMIN — KETOROLAC TROMETHAMINE 15 MG: 30 INJECTION, SOLUTION INTRAMUSCULAR; INTRAVENOUS at 15:27

## 2024-08-01 RX ADMIN — HYDROMORPHONE HYDROCHLORIDE 0.5 MG: 1 INJECTION, SOLUTION INTRAMUSCULAR; INTRAVENOUS; SUBCUTANEOUS at 16:30

## 2024-08-01 RX ADMIN — FENTANYL CITRATE 50 MCG: 50 INJECTION INTRAMUSCULAR; INTRAVENOUS at 16:10

## 2024-08-01 RX ADMIN — OXYCODONE 5 MG: 5 TABLET ORAL at 17:12

## 2024-08-01 RX ADMIN — Medication 0.5 MG: at 14:08

## 2024-08-01 NOTE — ANESTHESIA PROCEDURE NOTES
Peripheral Block    Patient location during procedure: holding area  Start time: 8/1/2024 1:20 PM  Reason for block: at surgeon's request and post-op pain management  Staffing  Performed by: Irving Lopes MD  Authorized by: Irving Lopes MD    Preanesthetic Checklist  Completed: patient identified, IV checked, site marked, risks and benefits discussed, surgical consent, monitors and equipment checked, pre-op evaluation and timeout performed  Peripheral Block  Patient position: supine  Prep: ChloraPrep  Patient monitoring: frequent blood pressure checks, continuous pulse oximetry and heart rate  Block type: Adductor Canal  Laterality: left  Injection technique: single-shot  Procedures: ultrasound guided, Ultrasound guidance required for the procedure to increase accuracy and safety of medication placement and decrease risk of complications.  Ultrasound permanent image saved  bupivacaine (PF) (MARCAINE) 0.25 % injection 20 mL - Perineural   7 mL - 8/1/2024 1:20:00 PM  bupivacaine liposomal (EXPAREL) 1.3 % injection 20 mL - Perineural   20 mL - 8/1/2024 1:20:00 PM  midazolam (VERSED) injection 0.5 mg - Intravenous   2 mg - 8/1/2024 1:19:00 PM  Needle  Needle type: Stimuplex   Needle gauge: 20 G  Needle length: 4 in  Needle localization: anatomical landmarks and ultrasound guidance  Assessment  Injection assessment: incremental injection, frequent aspiration, injected with ease, negative aspiration, negative for heart rate change, no paresthesia on injection, no symptoms of intraneural/intravenous injection and needle tip visualized at all times  Paresthesia pain: none  Post-procedure:  site cleaned  patient tolerated the procedure well with no immediate complications

## 2024-08-01 NOTE — ANESTHESIA PREPROCEDURE EVALUATION
Procedure:  ARTHROSCOPIC RECONSTRUCTION ANTERIOR CRUCIATE LIGAMENT (ACL) WITH AUTOGRAFT- quadiceps autografts (Left: Knee)  REPAIR MENISCUS (Left: Knee)    Relevant Problems   Rheumatology   (+) Rupture of anterior cruciate ligament of left knee, initial encounter      Other   (+) Obesity        Physical Exam    Airway    Mallampati score: II  TM Distance: >3 FB  Neck ROM: full     Dental       Cardiovascular      Pulmonary      Other Findings  post-pubertal.      Anesthesia Plan  ASA Score- 2     Anesthesia Type- general with ASA Monitors.         Additional Monitors:     Airway Plan:     Comment: Plan for long acting adductor canal nerve block discussed with risks, benefits, and alternatives. Approximate 1:40,000 risk of injury to nerve, vascular structures, or surrounding tissue quoted. Patient aware that this block is intended to be motor sparing.   .       Plan Factors-Exercise tolerance (METS): >4 METS.    Chart reviewed.   Existing labs reviewed. Patient summary reviewed.                  Induction- intravenous.    Postoperative Plan- Plan for postoperative opioid use. Planned trial extubation        Informed Consent- Anesthetic plan and risks discussed with patient.  I personally reviewed this patient with the CRNA. Discussed and agreed on the Anesthesia Plan with the CRNA..

## 2024-08-01 NOTE — ANESTHESIA POSTPROCEDURE EVALUATION
Post-Op Assessment Note    CV Status:  Stable  Pain Score: 5    Pain management: adequate       Mental Status:  Alert, awake and anxious   Hydration Status:  Euvolemic   PONV Controlled:  Controlled   Airway Patency:  Patent     Post Op Vitals Reviewed: Yes    No anethesia notable event occurred.    Staff: CRNA               BP   140/85   Temp   99.1   Pulse (!) 111 (08/01/24 1551)   Resp 16 (08/01/24 1551)    SpO2   99

## 2024-08-01 NOTE — OP NOTE
OPERATIVE REPORT  PATIENT NAME: Lovely Trevino    :  1995  MRN: [id number]  Pt Location: [facility] OR ROOM 03    SURGERY DATE: 2024    Surgeons and Role:     * Papa Kaminski,  - Primary     * Jessica Lindsay PA-C - Assisting    Preop Diagnosis:  Rupture of anterior cruciate ligament of left knee, initial encounter [S83.512A]    Post-Op Diagnosis Codes:     * Rupture of anterior cruciate ligament of left knee, initial encounter [S83.512A]    Procedure(s):  Left - ARTHROSCOPIC RECONSTRUCTION ANTERIOR CRUCIATE LIGAMENT (ACL) WITH AUTOGRAFT- quadiceps autografts  Left - MENISCECTOMY LATERAL /MEDIAL    Specimen(s):  * No specimens in log *    Estimated Blood Loss:   5 mL    Drains:  * No LDAs found *    Anesthesia Type:   General    Operative Indications:  Rupture of anterior cruciate ligament of left knee, initial encounter [S83.631E]    Complications:   None    Procedure and Technique:      Left  knee ACL autograft quad reconstruction  Left  knee arthroscopic medial meniscectomy          This is a patient with left complete acl tear. Due to the patient's MRI findings and active lifestyle, it was recommended that they proceed forward with ACL reconstruction. We reviewed risks and benefits of surgery and a decision was made to proceed with surgery to address their torn ACL      Findings:    Examination under anesthesia of the operative knee revealed a range of motion of 0-130 degrees. Posterior drawer testing was negative. Lachman testing was positive. Pivot shift testing was positive,  Collateral ligament stability testing revealed no laxity with valgus or varus stresses. With respect to posterolateral corner testing, dial testing at 30 and at 90 degrees was symmetric to the contralateral knee.     Arthroscopic evaluation of the left knee revealed the following:   Medial meniscus: Small tear on intermargin  Medial femoral condyle:Grade 0 chondral defects.  Medial tibial plateau: Grade 0 chondral defects.    Anterior cruciate ligament: Complete tear of the anterior cruciate ligament..   Posterior cruciate ligament: Normal appearance.   Lateral meniscus:   No tears  Lateral femoral condyle: Grade 0chIondral defects.   Lateral tibial plateau: Grade 0 chondral defects.    Medial and lateral gutters: No loose bodies.   Patella: Grade 0 chondral defects.   Trochlea: Grade 0 chondral defects.   Medial plica: No significant plica was present..      Procedure:  In the pre-operative holding area, the patient identified the correct operative extremity and I marked that extremity with my initials, using a permanent marker. The patient was then brought to the operating room and positioned supine. Following satisfactory induction of anesthesia, left knee was prepped and draped in the usual sterile fashion for surgical arthroscopy left knee . Before any surgical instrumentation was passed to me by the surgical technician, a formalized time-out occurred, which involves the surgeon, circulating nurse, and anesthesia staff all verifying the correct operative extremity. My initials were visible on the prepped and draped operative field.         A 3 in incision was made vertically in line with the superior border of patella and quadriceps tendon.  The skin was incised and hemostasis was obtained with electrocautery.  The subcutaneous fat was dissected until the bursa was encountered and removed from the anterior quadriceps tendon.  At this point an 9.5 mm quadriceps graft was harvested.  A knife was used to the find the distal aspect of the quadriceps tendon with a full-thickness quadriceps tendon graft.  The Quadpro tendon harvester was used after whip stitching the end of the quad tendon graft to harvest the remaining proximal quad tendon to a depth of 7 cm this was taken to the back table.  The graft was prepped for an all-inside graft length technique using SutureTape per Arthrex technique.  The quad tendon was then sutured together  side to side from the harvest site.  The deep layer of fat was closed with a running 0 Vicryl in the subcutaneous layer was closed with 2-0 Vicryl and the skin was closed with a running Monocryl stitch   The graft was placed under 15 lbs of tension and placed in a saline soaked sponge for later use during the surgery.     The anatomic landmarks of the anteromedial and anterolateral portals were marked. The anterolateral portal was established with a scalpel. The arthroscope was introduced through this portal. Under direct visualization, the anteromedial portal was established with a localizing needle followed by a scalpel. A probe was then introduced into the anteromedial portal. A systematic diagnostic arthroscopy evaluated the following:  medial compartment, notch, lateral compartment, patellofemoral compartment, medial gutter, and lateral gutter.      No lateral meniscus tears were seen    The medial meniscus had a small intermargin tear which was not able to be repaired due to the degenerative nature and intermargin aspect of the tear.  A meniscal biting and shaving device were used to trim the unstable meniscal fragments until a stable rim of meniscus remained.  This completed the partial medial meniscectomy.     The ACL was completely ruptured. The ACL reconstruction was performed by debriding the native ACL to the posterior aspect of the lateral femoral condyle.       The patient had had the femoral guide placed in the anatomic footprint of the ACL at the intercondylar notch, with the guide set on 105°.  The drill guide was advanced down to the skin, and an incision was made in line with the guide with a 15 blade. A hemostat was used to spread the subcutaneous skin and muscle to clear a path for the guide. The guide was then advanced down to bone. A  10 mm flip cutter was advanced through the guide until it entered the joint at the anatomic ACL footprint.  The femoral drill guide was disassembled and removed  from the joint, leaving the drill guide and drill in place. The metal drill guide was malleted into the femur to its depth stop of 7 mm.  The flip cutter was placed into the 90 degree cutting position by sliding the button on the drill of the flip cutter.  The rubber grommet was advanced to the base of the drill guide. The FlipCutter was retrodrilled 30 mm in length on the femoral side.  A fiberstick suture was placed and retrieved out the medial portal. The suture was held with a hemostatic into place.      A tibial guide was placed in the anatomic footprint at 55 degrees.  The drill guide was advanced down to the skin, and an incision was made in line with the guide with a 15 blade. A hemostat was used to spread the subcutaneous skin and muscle to clear a path for the guide. The guide was then advanced down to bone.  The 10  mm flip cutter was advanced through the guide until it entered the joint at the anatomic ACL footprint.  The tibial drill guide was disassembled and removed from the joint, leaving the flipcutter drill guide and drill in place. The metal drill guide was malleted into the tiba to its depth stop of 7 mm.  The flip cutter was placed into the 90 degree cutting position by sliding the button on the drill of the flip cutter.  The rubber grommet was advanced to the base of the drill guide. The FlipCutter was retrodrilled 30 mm in length on the tibial side.  A fiberstick suture was placed and retrieved out the medial portal. The suture was held with a hemostat into place.     The autograft acl was then brought to the field.  The femoral passing suture loop was release form the hemostat, and the femoral sided sutures from the autograft were shuttled out the femoral tunnel.  The sutures were then advanced until the button passed through the outer cortex of the femur, which was visualized with the arthroscope.  The button was flipped beyond the distal lateral femur.  The graft was cinched appropriately  into position.  The graft was then held in full extension under tension, and the TightRope ABS button was cinched on the proximal tibia.       The graft was noted to be appropriately taut on each end, and fine tuning with the cinching technique for the button proximally and distally revealed appropriate tension.  The graft was appropriately taut.  There was no impingement in flexion and extension, and there was no pathologic laxity to ligamentous stress testing.  Arthroscopic photos were taken throughout the case.        There was no additional pathology. All particulate debris was removed. The knee was copiously rinsed and then drained.  The anteromedial incision over the tibial tunnel as well as the quadriceps tendon incision was closed with buried #0 Vicryl suture.  The 2-0 Vicryl absorbable suturewas used to close the deep dermal layer.  A running subcuticular layer of 4-0 Monocryl was utilized for subcuticular closure.         The medial and lateral portals were closer with 4-0 monocryl.  The skin was cleansed with sterile saline and then dried before Steri-Strips were applied to all wounds. Finally, a sterile dressing was secured by Webril, an Ace wrap, and hinged knee brace.       I was present for the entire procedure., A qualified resident physician was not available., and A physician assistant was required during the procedure for retraction, tissue handling, dissection and suturing.    Patient Disposition:  PACU         SIGNATURE: Papa Kaminski DO  DATE: August 1, 2024  TIME: 3:32 PM

## 2024-08-02 ENCOUNTER — TELEPHONE (OUTPATIENT)
Age: 29
End: 2024-08-02

## 2024-08-02 NOTE — TELEPHONE ENCOUNTER
Patient called asking to speak with a nurse. Stated she is in excruciating pain. I placed the patient on hold and while messaging the nurse the call was disconnected.

## 2024-08-02 NOTE — TELEPHONE ENCOUNTER
Caller: Patient    Doctor: Yamilex    Reason for call: Patient had ACL surgery w/ provider yesterday.  Reports she is taking all the medications as prescribed but is an intense amount of pain, too much to properly rate on the pain scale.  She describes the pain as feeling as though the bone is coming through.    Patient denies fever, but would greatly appreciate a call back to advise.    Call back#: [phone]

## 2024-08-02 NOTE — TELEPHONE ENCOUNTER
Called and spoke w/pt and relayed Dr Kaminski's msg. She just took some Ibuprofen.  She thinks if she can get over this pain she will be ok. She did request to be scheduled for Monday at 3pm.  Will cancel if better. No further questions or concerns.  Did inform her that we have on-call providers if needed.

## 2024-08-02 NOTE — TELEPHONE ENCOUNTER
"Reviewed chart, pt had on 8/1/24 ARTHROSCOPIC RECONSTRUCTION ANTERIOR CRUCIATE LIGAMENT (ACL) WITH AUTOGRAFT- quadiceps autografts (Left: Knee) MENISCECTOMY LATERAL /MEDIAL (Left: Knee).  Pt also had nerve block.  Pt states pain level left knee is off the scale w/in last half hour. She is not due for any pain medication for 2 more hours. She is icing and elevating.  Oxycodone 5mg every 4 hours, ASA 81,g + half of a 500mg tablet (total 331mg) twice a day, Tylenol 500mg every 6 hours, she has not started the ibuprofen yet as they have not picked it up yet. She has been using topical roll Koi that she has been using on her calf as per Star, . Has mobility is foot and ankle and has some tingling in toes. Toes slightly cool and foot is warm. Capillary refill good.   She thinks the top of the tibia where they anchored the reconstructed ACL is coming through the bone-\"feels like it is pulling through the bone\"!  When she stands up, her knee pops.  She has the brace on where it was set.  Nothing changed on the brace.     Please review and advise. Thank you.   "

## 2024-08-06 ENCOUNTER — TELEPHONE (OUTPATIENT)
Dept: OBGYN CLINIC | Facility: HOSPITAL | Age: 29
End: 2024-08-06

## 2024-08-06 NOTE — TELEPHONE ENCOUNTER
Caller: Patient    Doctor: Yamilex    Reason for call: Patient is requesting a refill of Oxyxodone or any type of medication to help relive pain. Sx 8/01    Call back#: [phone]

## 2024-08-07 DIAGNOSIS — S83.512A RUPTURE OF ANTERIOR CRUCIATE LIGAMENT OF LEFT KNEE, INITIAL ENCOUNTER: Primary | ICD-10-CM

## 2024-08-07 RX ORDER — OXYCODONE HYDROCHLORIDE 5 MG/1
5 TABLET ORAL EVERY 4 HOURS PRN
Qty: 10 TABLET | Refills: 0 | Status: SHIPPED | OUTPATIENT
Start: 2024-08-07 | End: 2024-08-13 | Stop reason: SDUPTHER

## 2024-08-08 ENCOUNTER — TELEPHONE (OUTPATIENT)
Age: 29
End: 2024-08-08

## 2024-08-08 NOTE — TELEPHONE ENCOUNTER
Caller: Patient     Doctor: Yamilex    Reason for call: Please fax PT referral to: [phone]    Call back#: [phone]

## 2024-08-09 ENCOUNTER — EVALUATION (OUTPATIENT)
Dept: PHYSICAL THERAPY | Facility: MEDICAL CENTER | Age: 29
End: 2024-08-09

## 2024-08-09 DIAGNOSIS — Z47.89 AFTERCARE FOLLOWING SURGERY OF THE MUSCULOSKELETAL SYSTEM: ICD-10-CM

## 2024-08-09 DIAGNOSIS — S83.512D SPRAIN OF ANTERIOR CRUCIATE LIGAMENT OF LEFT KNEE, SUBSEQUENT ENCOUNTER: Primary | ICD-10-CM

## 2024-08-09 DIAGNOSIS — S83.512D RUPTURE OF ANTERIOR CRUCIATE LIGAMENT OF LEFT KNEE, SUBSEQUENT ENCOUNTER: ICD-10-CM

## 2024-08-09 PROCEDURE — 97161 PT EVAL LOW COMPLEX 20 MIN: CPT

## 2024-08-09 NOTE — PROGRESS NOTES
PT Evaluation     Today's date: 2024  Patient name: Lovely Trevino  : 1995  MRN: [id number]  Referring provider: Jessica Lindsay PA-C  Dx:   Encounter Diagnosis     ICD-10-CM    1. Sprain of anterior cruciate ligament of left knee, subsequent encounter  S83.512D       2. Aftercare following surgery of the musculoskeletal system  Z47.89 Ambulatory Referral to Physical Therapy      3. Rupture of anterior cruciate ligament of left knee, subsequent encounter  S83.512D Ambulatory Referral to Physical Therapy          Start Time: 1245  Stop Time: 1330  Total time in clinic (min): 45 minutes    Assessment  Impairments: lacks appropriate home exercise program    Assessment details: Lovely Trevino is a pleasant 28 y.o. female who presents s/p L ACL reconstruction 24. Upon eval today, she has LE WB intolerance, LE activity intolerance, abnormal gait  and decreased strength and ROM of the L LE as compared with the R LE resulting in worry over not knowing what's wrong, fear of not being able to keep active, and pain with ADLs.  No further referral appears necessary at this time based upon examination results.  I expect she will improve within 12 weeks of skilled PT.  Positive prognostic indicators include positive attitude toward recovery, good understanding of diagnosis and treatment plan options, acuity of symptoms, absence of peripheralization, and absence of observed red flags.  Negative prognostic indicators include obesity.  During today's session, the patient was provided with education surrounding precautions, flat ground amb with AD, stair negotiation with AD, possible home modifications to promote patient safety, and therapeutic exercise for the purpose of DVT prevention and preventing contracture formation. All patient questions were answered. Patient verbalized understanding of all of the information provided.      Comparable signs:  1) WB  2) Knee ROM    Problem List:  1) LE activity  intolerance  2) WB intolerance  3) Poor L LE strength  4) Payton L knee ROM  5) Abnormal gait  Understanding of Dx/Px/POC: excellent     Prognosis: excellent    Goals  Impairment based goals  Patient will demonstrate improvements in L LE strength that are consistent with current phase of tx protocol.  Patient will demonstrate improvements in L LE ROM that are consistent with current phase of tx protocol.  Patient will consistently demonstrate normal squatting mechanics during a tx session centered around improving LE strength and power.  Patient will improve L knee strength to 5/5 in all planes by time of d/c in order to improve ease and stability with functional mobility.      Functional based goals to be completed by time of d/c  Patient will resume all ADLs and work duties with min c/o L LE pain.  Patient will improve FOTO to greater than predicted value.  Patient will be independent with home exercise program.   Patient will be able to manage symptoms independently.     Plan    Planned therapy interventions: abdominal trunk stabilization, activity modification, ADL training, balance/weight bearing training, gait training, home exercise program, therapeutic exercise, therapeutic activities, stretching, strengthening, patient education, neuromuscular re-education, postural training, therapeutic training, joint mobilization, IASTM, kinesiology taping, manual therapy, massage, Diana taping, motor coordination training, muscle pump exercises, nerve gliding, self care, work reintegration, fine motor coordination training, flexibility, functional ROM exercises, graded activity, graded exercise, graded motor, IADL retraining, balance, behavior modification, body mechanics training, breathing training, transfer training and coordination    Frequency: 2x week  Duration in weeks: 12  Treatment plan discussed with: patient        Subjective Evaluation    History of Present Illness  Mechanism of injury: Lovely Trevino  is 28 y.o. female presenting to PT s/p L ACL reconstruction 24.  Pain  Current pain ratin  At best pain rating: 3  At worst pain rating: 10  Location: L knee  Quality: throbbing (deep)  Relieving factors: ice, rest, support and medications  Aggravating factors: standing, walking and sitting    Social Support    Employment status: working (animal hospital)        Objective     Concurrent Complaints  Negative for night pain, disturbed sleep, bladder dysfunction, bowel dysfunction, saddle (S4) numbness, cardiac problem, kidney problem, gallbladder problem, stomach problem, ulcer, appendix problem, spleen problem, pancreas problem, history of cancer, history of trauma and infection    Neurological Testing     Sensation     Lumbar   Left   Intact: light touch    Right   Intact: light touch    Reflexes   Left   Babinski sign: negative    Right   Patellar (L4): normal (2+)  Achilles (S1): normal (2+)  Babinski sign: negative    Active Range of Motion   Left Knee   Flexion: 71 degrees with pain  Extension: -10 degrees with pain    Right Knee   Normal active range of motion    Passive Range of Motion   Left Knee   Flexion: 74 degrees with pain  Extension: -8 degrees     Right Knee   Normal passive range of motion    Strength/Myotome Testing     Right Hip   Normal muscle strength    Right Knee   Normal strength    Left Ankle/Foot   Dorsiflexion: 5    Right Ankle/Foot   Dorsiflexion: 5    Ambulation     Comments   RW, L knee immobilizer, unable to bear weight on L LE             Precautions:   Past Medical History:   Diagnosis Date    Anesthesia complication     pt has hx of waking up during procedures, if this happens she wakes up combative/violent    Stomach ulcer     Vaginal yeast infection      No Known Allergies  Eval/Re-Eval POC Expires Auth #/ Referral # Total Visits Start Date Expiration Date Extension Info Visits Limitation                                                                         1 2 3 4 5 6    8/9        7 8 9 10 11 12           13 14 15 16 17 18           19 20 21 22 23 24           25 26 27 28 29 30                               Access Code: [email or web address]  URL: [email or web address]  Date: 08/09/2024  Prepared by: Abram Nelson    Exercises  - Supine Quad Set  - 1 x daily - 7 x weekly - 2 sets - 10 reps - 3 seconds hold  - Supine Heel Slide with Strap  - 1 x daily - 7 x weekly - 1 sets - 10 reps - 10 seconds hold  - Long Sitting Calf Stretch with Strap  - 1 x daily - 7 x weekly - 3 sets - 30 seconds hold  - Seated Ankle Pumps  - 5 x daily - 7 x weekly - 1 sets - 25 reps  - Seated Table Hamstring Stretch  - 1 x daily - 7 x weekly - 3 sets - 30 seconds hold  - Side to Side Weight Shift with Counter Support  - 1 x daily - 7 x weekly - 2-3 sets - 1 minute hold    Manuals 8/9            L knee PROM                                                    Neuro Re-Ed             HEP review/pt education 25'            QS             Weight shifting             HR/TR             LSU/FSU                                       Ther Ex             Heel slide             Calf stretching             HSS             Mod leonela                                                                 Ther Activity                                       Gait Training                                       Modalities

## 2024-08-12 ENCOUNTER — OFFICE VISIT (OUTPATIENT)
Dept: OBGYN CLINIC | Facility: MEDICAL CENTER | Age: 29
End: 2024-08-12

## 2024-08-12 VITALS — BODY MASS INDEX: 38.27 KG/M2 | HEIGHT: 65 IN | DIASTOLIC BLOOD PRESSURE: 80 MMHG | SYSTOLIC BLOOD PRESSURE: 119 MMHG

## 2024-08-12 DIAGNOSIS — Z87.828 S/P ARTHROSCOPIC PARTIAL MEDIAL MENISCECTOMY OF LEFT KNEE: ICD-10-CM

## 2024-08-12 DIAGNOSIS — S83.242A OTHER TEAR OF MEDIAL MENISCUS, CURRENT INJURY, LEFT KNEE, INITIAL ENCOUNTER: ICD-10-CM

## 2024-08-12 DIAGNOSIS — Z98.890 S/P ARTHROSCOPIC RECONSTRUCTION OF ACL OF LEFT KNEE USING QUADRICEPS TENDON AUTOGRAFT: Primary | ICD-10-CM

## 2024-08-12 DIAGNOSIS — Z98.890 S/P ARTHROSCOPIC PARTIAL MEDIAL MENISCECTOMY OF LEFT KNEE: ICD-10-CM

## 2024-08-12 DIAGNOSIS — S83.512A RUPTURE OF ANTERIOR CRUCIATE LIGAMENT OF LEFT KNEE, INITIAL ENCOUNTER: ICD-10-CM

## 2024-08-12 DIAGNOSIS — Z87.39 S/P ARTHROSCOPIC RECONSTRUCTION OF ACL OF LEFT KNEE USING QUADRICEPS TENDON AUTOGRAFT: Primary | ICD-10-CM

## 2024-08-12 PROCEDURE — 99024 POSTOP FOLLOW-UP VISIT: CPT | Performed by: ORTHOPAEDIC SURGERY

## 2024-08-12 NOTE — PROGRESS NOTES
Knee Post Operative Visit     Assesment:     28 y.o. female 11 days s/p surgical arthroscopy of the left knee with ACL quadriceps autograft reconstruction, partial medial meniscectomy, DOS: 08/01/2024.    Plan:    Post-Operative treatment:    Ice to knee for 20 minutes at least 1-2 times daily.  Continue PT per Dr. Kaminski's protocol.  OTC NSAIDs or Tylenol for pain.  Steri-strips removed during today's visit. Incisions appeared healed. Extra steri-strips provided to the patient should she need them.    Imaging:    Intra-operative images reviewed with the patient during today's visit.    Weight bearing:  as tolerated     ROM:  Full    Brace:  Wear brace at all times other than for showers, Keep brace locked in extension for ambulation, and May unlock brace for ROM exercises    DVT Prophylaxis:  Aspirin 81 mg oral twice daily x 4 weeks and Ambulation    Follow up:   5 weeks    Patient was advised that if they have any fevers, chills, chest pain, shortness of breath, redness or drainage from the incision, please let our office know immediately.          Chief Complaint   Patient presents with    Left Knee - Post-op     Would like refill of oxy.       History of Present Illness:    The patient is a 28 y.o. female who is being evaluated post operatively 11 days s/p surgical arthroscopy of the left knee with ACL quadriceps autograft reconstruction, partial medial meniscectomy, DOS: 08/01/2024.    Since the prior visit, She reports minimal improvement. She is ambulating with walker.    Pain is well controlled. Patient is taking oxycodone as needed for pain. The patient is using ice to control swelling.    They have started physical therapy. She is unsure if her insurance will cover PT and is hoping to perform [facility] if not.     The patient Aspirin 250 mg oral twice daily x 4 weeks and Ambulation for DVT ppx.  The patient has been ambulating without crutches.    The patient has been ambulating with a  "brace.    The patient denies any fevers, chills, calf pain, chest pain/shortness of breath, redness or drainage from the incision.         I have reviewed the past medical, surgical, social and family history, medications and allergies as documented in the EMR.    Review of systems: ROS is negative other than that noted in the HPI.  Constitutional: Negative for fatigue and fever.        Physical Exam:    Blood pressure 119/80, height 5' 5\" (1.651 m), last menstrual period 07/22/2024, not currently breastfeeding.    General/Constitutional: NAD, well developed, well nourished  HENT: Normocephalic, atraumatic  CV: Intact distal pulses, regular rate  Resp: No respiratory distress or labored breathing  Lymphatic: No lymphadenopathy palpated  Neuro: Alert and Oriented x 3, no focal deficits  Psych: Normal mood, normal affect, normal judgement, normal behavior  Skin: Warm, dry, no rashes, no erythema      Knee Exam (focused):                  RIGHT LEFT   ROM:   0-130 0-70   Palpation: Effusion negative moderate     MJL tenderness Negative Positive     LJL tenderness Negative Positive   Instability: Varus stable stable     Valgus stable stable   Special Tests: Lachman Negative Negative     Posterior drawer Negative Negative     Anterior drawer Negative Negative     Pivot shift not tested not tested     Dial not tested not tested   Patella: Palpation no tenderness no tenderness     Mobility 1/4 1/4     Apprehension Negative Negative   Other: Single leg 1/4 squat not tested not tested      Incisions show no erythema, no drainage    LE NV Exam: +2 DP/PT pulses bilaterally  Sensation intact to light touch L2-S1 bilaterally     Bilateral hip ROM demonstrates no pain actively or passively    No calf tenderness to palpation bilaterally    Scribe Attestation      I,:  Pily Mcknight am acting as a scribe while in the presence of the attending physician.:       I,:  Papa Kaminski,  personally performed the services described in " this documentation    as scribed in my presence.:

## 2024-08-13 ENCOUNTER — OFFICE VISIT (OUTPATIENT)
Dept: PHYSICAL THERAPY | Facility: MEDICAL CENTER | Age: 29
End: 2024-08-13

## 2024-08-13 DIAGNOSIS — Z47.89 AFTERCARE FOLLOWING SURGERY OF THE MUSCULOSKELETAL SYSTEM: ICD-10-CM

## 2024-08-13 DIAGNOSIS — S83.512D RUPTURE OF ANTERIOR CRUCIATE LIGAMENT OF LEFT KNEE, SUBSEQUENT ENCOUNTER: ICD-10-CM

## 2024-08-13 DIAGNOSIS — S83.512A RUPTURE OF ANTERIOR CRUCIATE LIGAMENT OF LEFT KNEE, INITIAL ENCOUNTER: ICD-10-CM

## 2024-08-13 DIAGNOSIS — S83.512D SPRAIN OF ANTERIOR CRUCIATE LIGAMENT OF LEFT KNEE, SUBSEQUENT ENCOUNTER: Primary | ICD-10-CM

## 2024-08-13 PROCEDURE — 97110 THERAPEUTIC EXERCISES: CPT

## 2024-08-13 PROCEDURE — 97112 NEUROMUSCULAR REEDUCATION: CPT

## 2024-08-13 NOTE — PROGRESS NOTES
Daily Note     Today's date: 2024  Patient name: Lovely Trevino  : 1995  MRN: [id number]  Referring provider: Jessica Lindsay PA-C  Dx:   Encounter Diagnosis     ICD-10-CM    1. Sprain of anterior cruciate ligament of left knee, subsequent encounter  S83.512D       2. Aftercare following surgery of the musculoskeletal system  Z47.89       3. Rupture of anterior cruciate ligament of left knee, subsequent encounter  S83.512D           Start Time: 1545  Stop Time: 1625  Total time in clinic (min): 40 minutes    Subjective: Patient reports improvements in soreness upon arrival. Presents with RW with signif antalgia, reports bearing ~15% of her weight through her L LE with amb. Per f/u with ortho      Objective: See treatment diary below    L knee PROM:  Flex: 89*  Ext: -8*    Assessment: Tolerated treatment well. Improvements in PROM noted at the beginning of th session. Unable to perform SLR against gravity 2/to poor quadriceps contraction, performed in standing with improved tolerance. Instructed patient to progress WB at home. Patient would benefit from continued PT      Plan: Continue per plan of care.      Precautions:   Past Medical History:   Diagnosis Date    Anesthesia complication     pt has hx of waking up during procedures, if this happens she wakes up combative/violent    Stomach ulcer     Vaginal yeast infection      No Known Allergies  Eval/Re-Eval POC Expires Auth #/ Referral # Total Visits Start Date Expiration Date Extension Info Visits Limitation                                                                         1 2 3 4 5 6          7 8 9 10 11 12           13 14 15 16 17 18           19 20 21 22 23 24           25 26 27 28 29 30                               Access Code: [email or web address]  URL: [email or web address]  Date: 2024  Prepared by: Abram Nelson    Exercises  - Supine Quad Set  - 1 x daily - 7 x weekly - 2 sets - 10 reps - 3 seconds hold  - Supine Heel  "Slide with Strap  - 1 x daily - 7 x weekly - 1 sets - 10 reps - 10 seconds hold  - Long Sitting Calf Stretch with Strap  - 1 x daily - 7 x weekly - 3 sets - 30 seconds hold  - Seated Ankle Pumps  - 5 x daily - 7 x weekly - 1 sets - 25 reps  - Seated Table Hamstring Stretch  - 1 x daily - 7 x weekly - 3 sets - 30 seconds hold  - Side to Side Weight Shift with Counter Support  - 1 x daily - 7 x weekly - 2-3 sets - 1 minute hold    Manuals 8/9 8/13           L knee PROM                                                    Neuro Re-Ed             HEP review/pt education 25'            QS  2x20 3\"           Weight shifting  20x 5\"           FTEO             HR/TR             LSU/FSU                                       Ther Ex             Heel slide  10x10\"           Calf stretching  3x30\"           HSS  3x30\"           Mod leonela  3x30\"           4 way SLR  P!           3 way hip  R LE WB only, 10x ea 3\" holds                                     Ther Activity                                       Gait Training                                       Modalities                                            "

## 2024-08-14 RX ORDER — OXYCODONE HYDROCHLORIDE 5 MG/1
5 TABLET ORAL EVERY 4 HOURS PRN
Qty: 10 TABLET | Refills: 0 | Status: SHIPPED | OUTPATIENT
Start: 2024-08-14

## 2024-08-15 ENCOUNTER — OFFICE VISIT (OUTPATIENT)
Dept: PHYSICAL THERAPY | Facility: MEDICAL CENTER | Age: 29
End: 2024-08-15

## 2024-08-15 DIAGNOSIS — Z47.89 AFTERCARE FOLLOWING SURGERY OF THE MUSCULOSKELETAL SYSTEM: Primary | ICD-10-CM

## 2024-08-15 DIAGNOSIS — S83.512D SPRAIN OF ANTERIOR CRUCIATE LIGAMENT OF LEFT KNEE, SUBSEQUENT ENCOUNTER: ICD-10-CM

## 2024-08-15 DIAGNOSIS — S83.512D RUPTURE OF ANTERIOR CRUCIATE LIGAMENT OF LEFT KNEE, SUBSEQUENT ENCOUNTER: ICD-10-CM

## 2024-08-15 PROCEDURE — 97112 NEUROMUSCULAR REEDUCATION: CPT

## 2024-08-15 PROCEDURE — 97110 THERAPEUTIC EXERCISES: CPT

## 2024-08-15 NOTE — PROGRESS NOTES
Daily Note     Today's date: 8/15/2024  Patient name: Lovely Trevino  : 1995  MRN: [id number]  Referring provider: Jessica Lindsay PA-C  Dx:   Encounter Diagnosis     ICD-10-CM    1. Aftercare following surgery of the musculoskeletal system  Z47.89       2. Rupture of anterior cruciate ligament of left knee, subsequent encounter  S83.512D       3. Sprain of anterior cruciate ligament of left knee, subsequent encounter  S83.512D           Start Time: 1515  Stop Time: 1555  Total time in clinic (min): 40 minutes    Subjective: Patient reports increasing WB at home, reports soreness upon arrival.      Objective: See treatment diary below    L knee PROM:  Flex: 90*  Ext: -8*    Assessment: Tolerated treatment well. Improvements in WB noted throughout, able to bear weight through L LE but with B UE support and signif antalgia. Progressed well with PROM. Unable to perform SLR again today 2/to difficulty with quad contraction. Instructed to cont with performance of quad sets daily at home as well as to cont to progress WB to promote normal gait mechanics. Patient would benefit from continued PT      Plan: Continue per plan of care.      Precautions:   Past Medical History:   Diagnosis Date    Anesthesia complication     pt has hx of waking up during procedures, if this happens she wakes up combative/violent    Stomach ulcer     Vaginal yeast infection      No Known Allergies  Eval/Re-Eval POC Expires Auth #/ Referral # Total Visits Start Date Expiration Date Extension Info Visits Limitation                                                                         1 2 3 4 5 6   8/9 8/13 8/15      7 8 9 10 11 12           13 14 15 16 17 18           19 20 21 22 23 24           25 26 27 28 29 30                               Access Code: [email or web address]  URL: [email or web address]  Date: 2024  Prepared by: Abram Nelson    Exercises  - Supine Quad Set  - 1 x daily - 7 x weekly - 2 sets - 10 reps - 3  "seconds hold  - Supine Heel Slide with Strap  - 1 x daily - 7 x weekly - 1 sets - 10 reps - 10 seconds hold  - Long Sitting Calf Stretch with Strap  - 1 x daily - 7 x weekly - 3 sets - 30 seconds hold  - Seated Ankle Pumps  - 5 x daily - 7 x weekly - 1 sets - 25 reps  - Seated Table Hamstring Stretch  - 1 x daily - 7 x weekly - 3 sets - 30 seconds hold  - Side to Side Weight Shift with Counter Support  - 1 x daily - 7 x weekly - 2-3 sets - 1 minute hold    Manuals 8/9 8/13 8/15          L knee PROM                                                    Neuro Re-Ed             HEP review/pt education 25'            QS  2x20 3\" 2x20 3\"          Weight shifting  20x 5\" 20x 5\"          Standing march   10x P!          FTEO             HR/TR             LSU/FSU                                       Ther Ex             Heel slide  10x10\"           Calf stretching  3x30\" 3x30\"          HSS  3x30\" 3x30\"          Mod leonela  3x30\" 3x30          4 way SLR  P!           3 way hip  R LE WB only, 10x ea 3\" holds R LE WB only, 15x ea 3\" holds          Prone knee flx                          Ther Activity                                       Gait Training                                       Modalities                                            "

## 2024-08-22 ENCOUNTER — APPOINTMENT (OUTPATIENT)
Dept: PHYSICAL THERAPY | Facility: MEDICAL CENTER | Age: 29
End: 2024-08-22
Payer: COMMERCIAL

## 2024-08-27 ENCOUNTER — OFFICE VISIT (OUTPATIENT)
Dept: PHYSICAL THERAPY | Facility: MEDICAL CENTER | Age: 29
End: 2024-08-27
Payer: COMMERCIAL

## 2024-08-27 DIAGNOSIS — Z47.89 AFTERCARE FOLLOWING SURGERY OF THE MUSCULOSKELETAL SYSTEM: Primary | ICD-10-CM

## 2024-08-27 DIAGNOSIS — S83.512D RUPTURE OF ANTERIOR CRUCIATE LIGAMENT OF LEFT KNEE, SUBSEQUENT ENCOUNTER: ICD-10-CM

## 2024-08-27 DIAGNOSIS — S83.512D SPRAIN OF ANTERIOR CRUCIATE LIGAMENT OF LEFT KNEE, SUBSEQUENT ENCOUNTER: ICD-10-CM

## 2024-08-27 PROCEDURE — 97110 THERAPEUTIC EXERCISES: CPT

## 2024-08-27 PROCEDURE — 97112 NEUROMUSCULAR REEDUCATION: CPT

## 2024-08-27 NOTE — PROGRESS NOTES
Daily Note     Today's date: 2024  Patient name: Lovely Trevino  : 1995  MRN: [id number]  Referring provider: Jessica Lindsay PA-C  Dx:   Encounter Diagnosis     ICD-10-CM    1. Aftercare following surgery of the musculoskeletal system  Z47.89       2. Rupture of anterior cruciate ligament of left knee, subsequent encounter  S83.512D       3. Sprain of anterior cruciate ligament of left knee, subsequent encounter  S83.512D           Start Time: 1625  Stop Time: 1705  Total time in clinic (min): 40 minutes    Subjective: Patient presents WBAT with B axillary crutch use, reports increasing her walking at home. Reports compliance with HEP despite missing tx last week.      Objective: See treatment diary below    L knee PROM:  Flex: 106*  Ext: -12*    Assessment: Tolerated treatment well. Improved flexion ROM noted. Reduced ext noted. Tolerant of standing interventions with B UE support today. Taught amb with 1 crutch use with antalgia. Educated patient to cont to progress WB at home and to cont with ROM interventions to progress towards WNL knee ROM. Educated on current tx protocol and importance of advancing ROM and WB. Patient would benefit from continued PT      Plan: Continue per plan of care.      Precautions:   Past Medical History:   Diagnosis Date    Anesthesia complication     pt has hx of waking up during procedures, if this happens she wakes up combative/violent    Stomach ulcer     Vaginal yeast infection      No Known Allergies  Eval/Re-Eval POC Expires Auth #/ Referral # Total Visits Start Date Expiration Date Extension Info Visits Limitation                                                                         1 2 3 4 5 6   8/9 8/13 8/15 8/27     7 8 9 10 11 12           13 14 15 16 17 18           19 20 21 22 23 24           25 26 27 28 29 30                               Access Code: [email or web address]  URL: [email or web address]  Date: 2024  Prepared by: Abram  "Omar    Exercises  - Standing March with Counter Support  - 2 x daily - 7 x weekly - 1 sets - 10 reps  - Supine Hamstring Stretch with Strap  - 1 x daily - 7 x weekly - 3 sets - 30 seconds hold  - Seated Table Hamstring Stretch  - 1 x daily - 7 x weekly - 3 sets - 30 seconds hold  - Prone Quadricep Stretch with Strap  - 1 x daily - 7 x weekly - 3 sets - 30 seconds hold  - Supine Quadriceps Stretch with Strap on Table  - 1 x daily - 7 x weekly - 3 sets - 30 seconds hold  - Prone Knee Flexion  - 1 x daily - 7 x weekly - 2 sets - 10 reps  - Supine Active Straight Leg Raise  - 1 x daily - 7 x weekly - 2 sets - 10 reps  - Sidelying Hip Abduction  - 1 x daily - 7 x weekly - 2 sets - 10 reps    Manuals 8/9 8/13 8/15 8/27         L knee PROM                                                    Neuro Re-Ed             HEP review/pt education 25'            QS  2x20 3\" 2x20 3\" 2x20 3\"         Weight shifting  20x 5\" 20x 5\"          Standing march   10x P! 15x 3\" holds P!         FTEO             HR/TR             LSU/FSU                                       Ther Ex             Heel slide  10x10\"  10x10\"         Calf stretching  3x30\" 3x30\" 3x30\"         HSS  3x30\" 3x30\" 3x30\"         Mod leonela  3x30\" 3x30 3x30\"         4 way SLR  P!  15x ea          3 way hip  R LE WB only, 10x ea 3\" holds R LE WB only, 15x ea 3\" holds          Prone knee flx    20x 3\"                      Ther Activity                                       Gait Training             With 1 axillary crutch    CM                      Modalities                                            "

## 2024-08-29 ENCOUNTER — OFFICE VISIT (OUTPATIENT)
Dept: PHYSICAL THERAPY | Facility: MEDICAL CENTER | Age: 29
End: 2024-08-29
Payer: COMMERCIAL

## 2024-08-29 DIAGNOSIS — S83.512D SPRAIN OF ANTERIOR CRUCIATE LIGAMENT OF LEFT KNEE, SUBSEQUENT ENCOUNTER: ICD-10-CM

## 2024-08-29 DIAGNOSIS — Z47.89 AFTERCARE FOLLOWING SURGERY OF THE MUSCULOSKELETAL SYSTEM: Primary | ICD-10-CM

## 2024-08-29 DIAGNOSIS — S83.512D RUPTURE OF ANTERIOR CRUCIATE LIGAMENT OF LEFT KNEE, SUBSEQUENT ENCOUNTER: ICD-10-CM

## 2024-08-29 PROCEDURE — 97110 THERAPEUTIC EXERCISES: CPT

## 2024-08-29 PROCEDURE — 97112 NEUROMUSCULAR REEDUCATION: CPT

## 2024-08-29 NOTE — PROGRESS NOTES
Daily Note     Today's date: 2024  Patient name: Lovely Trevino  : 1995  MRN: [id number]  Referring provider: Jessica Lindsay PA-C  Dx:   Encounter Diagnosis     ICD-10-CM    1. Aftercare following surgery of the musculoskeletal system  Z47.89       2. Rupture of anterior cruciate ligament of left knee, subsequent encounter  S83.512D       3. Sprain of anterior cruciate ligament of left knee, subsequent encounter  S83.512D             Start Time: 1710  Stop Time: 1744  Total time in clinic (min): 34 minutes    Subjective: Patient  reports soreness and pain in her knee. She reports no changes or updates since last therapy session.       Objective: See treatment diary below      Assessment: Patient tolerated treatment fairly.  Continued with exercise program as charted below. Decreased number of reps of slr exercises  able to be performed today. Challenged noted with weight shifts with mini marches, reliance on ue support. Fatigue noted toward the end of the session. Patient would benefit from continued PT.       Plan: Continue per plan of care.      Precautions:   Past Medical History:   Diagnosis Date    Anesthesia complication     pt has hx of waking up during procedures, if this happens she wakes up combative/violent    Stomach ulcer     Vaginal yeast infection      No Known Allergies  Eval/Re-Eval POC Expires Auth #/ Referral # Total Visits Start Date Expiration Date Extension Info Visits Limitation                                                                   1 2 3 4 5 6   8/9 8/13 8/15 8/27 8/29     7 8 9 10 11 12           13 14 15 16 17 18           19 20 21 22 23 24           25 26 27 28 29 30               Access Code: [email or web address]  URL: [email or web address]  Date: 2024  Prepared by: Abram Nelson    Exercises  - Standing March with Counter Support  - 2 x daily - 7 x weekly - 1 sets - 10 reps  - Supine Hamstring Stretch with Strap  - 1 x daily - 7 x weekly - 3 sets - 30  "seconds hold  - Seated Table Hamstring Stretch  - 1 x daily - 7 x weekly - 3 sets - 30 seconds hold  - Prone Quadricep Stretch with Strap  - 1 x daily - 7 x weekly - 3 sets - 30 seconds hold  - Supine Quadriceps Stretch with Strap on Table  - 1 x daily - 7 x weekly - 3 sets - 30 seconds hold  - Prone Knee Flexion  - 1 x daily - 7 x weekly - 2 sets - 10 reps  - Supine Active Straight Leg Raise  - 1 x daily - 7 x weekly - 2 sets - 10 reps  - Sidelying Hip Abduction  - 1 x daily - 7 x weekly - 2 sets - 10 reps    Manuals 8/9 8/13 8/15 8/27 8/29         L knee PROM                                                    Neuro Re-Ed             HEP review/pt education 25'            QS  2x20 3\" 2x20 3\" 2x20 3\" 2x20 3\"         Weight shifting  20x 5\" 20x 5\"          Standing march   10x P! 15x 3\" holds P! 15x 3\"         FTEO             HR/TR             LSU/FSU                                       Ther Ex             Heel slide  10x10\"  10x10\" 10x10\"         Calf stretching  3x30\" 3x30\" 3x30\" 3x30\"         HSS  3x30\" 3x30\" 3x30\" 3x30\"         Mod leonela  3x30\" 3x30 3x30\" 3x30\"         4 way SLR  P!  15x ea  10x ea         3 way hip  R LE WB only, 10x ea 3\" holds R LE WB only, 15x ea 3\" holds          Prone knee flx    20x 3\" 20x 3\"                      Ther Activity                                       Gait Training             With 1 axillary crutch    CM                      Modalities                                            "

## 2024-09-03 ENCOUNTER — OFFICE VISIT (OUTPATIENT)
Dept: PHYSICAL THERAPY | Facility: MEDICAL CENTER | Age: 29
End: 2024-09-03
Payer: COMMERCIAL

## 2024-09-03 DIAGNOSIS — S83.512D SPRAIN OF ANTERIOR CRUCIATE LIGAMENT OF LEFT KNEE, SUBSEQUENT ENCOUNTER: ICD-10-CM

## 2024-09-03 DIAGNOSIS — Z47.89 AFTERCARE FOLLOWING SURGERY OF THE MUSCULOSKELETAL SYSTEM: Primary | ICD-10-CM

## 2024-09-03 DIAGNOSIS — S83.512D RUPTURE OF ANTERIOR CRUCIATE LIGAMENT OF LEFT KNEE, SUBSEQUENT ENCOUNTER: ICD-10-CM

## 2024-09-03 PROCEDURE — 97112 NEUROMUSCULAR REEDUCATION: CPT

## 2024-09-03 PROCEDURE — 97110 THERAPEUTIC EXERCISES: CPT

## 2024-09-03 NOTE — PROGRESS NOTES
Daily Note     Today's date: 9/3/2024  Patient name: Lovely Trevino  : 1995  MRN: [id number]  Referring provider: Jessica Lindsay PA-C  Dx:   Encounter Diagnosis     ICD-10-CM    1. Aftercare following surgery of the musculoskeletal system  Z47.89       2. Sprain of anterior cruciate ligament of left knee, subsequent encounter  S83.512D       3. Rupture of anterior cruciate ligament of left knee, subsequent encounter  S83.512D             Start Time: 1518  Stop Time: 1541  Total time in clinic (min): 23 minutes    Subjective: Patient reports cont soreness. Presents today with only 1 axillary crutch for amb.      Objective: See treatment diary below    L knee PROM:  Flx: 109* P!  Ext: -12* P!    Assessment: Patient tolerated treatment fair. Emphasized importance of continuing to progress knee ROM and continuing to progress L LE WB. Unable to perform SLR without quad lag. Performed standing hip flx/ext/abd with knee extended to address ongoing quad weakness and inability to perform SLR. Will cont to progress as tolerated. Patient would benefit from continued PT.       Plan: Continue per plan of care.      Precautions:   Past Medical History:   Diagnosis Date    Anesthesia complication     pt has hx of waking up during procedures, if this happens she wakes up combative/violent    Stomach ulcer     Vaginal yeast infection      No Known Allergies  Eval/Re-Eval POC Expires Auth #/ Referral # Total Visits Start Date Expiration Date Extension Info Visits Limitation                                                                   1 2 3 4 5 6   8/9 8/13 8/15 8/27 8/29  9/3   7 8 9 10 11 12           13 14 15 16 17 18           19 20 21 22 23 24           25 26 27 28 29 30               Access Code: [email or web address]  URL: [email or web address]  Date: 2024  Prepared by: Abram Nelson    Exercises  - Standing March with Counter Support  - 2 x daily - 7 x weekly - 1 sets - 10 reps  - Supine Hamstring Stretch  "with Strap  - 1 x daily - 7 x weekly - 3 sets - 30 seconds hold  - Seated Table Hamstring Stretch  - 1 x daily - 7 x weekly - 3 sets - 30 seconds hold  - Prone Quadricep Stretch with Strap  - 1 x daily - 7 x weekly - 3 sets - 30 seconds hold  - Supine Quadriceps Stretch with Strap on Table  - 1 x daily - 7 x weekly - 3 sets - 30 seconds hold  - Prone Knee Flexion  - 1 x daily - 7 x weekly - 2 sets - 10 reps  - Supine Active Straight Leg Raise  - 1 x daily - 7 x weekly - 2 sets - 10 reps  - Sidelying Hip Abduction  - 1 x daily - 7 x weekly - 2 sets - 10 reps    Manuals 8/9 8/13 8/15 8/27 8/29  9/3       L knee PROM      CM                                              Neuro Re-Ed             HEP review/pt education 25'            QS  2x20 3\" 2x20 3\" 2x20 3\" 2x20 3\"  2x20 3\"       Weight shifting  20x 5\" 20x 5\"          Standing march   10x P! 15x 3\" holds P! 15x 3\"  15x 3\"       FTEO             HR/TR             LSU/FSU                                       Ther Ex             Heel slide  10x10\"  10x10\" 10x10\"  10x10\"       Calf stretching  3x30\" 3x30\" 3x30\" 3x30\"  3x30\"       HSS  3x30\" 3x30\" 3x30\" 3x30\"  3x30\"       Mod leonela  3x30\" 3x30 3x30\" 3x30\"  3x30\"       4 way SLR  P!  15x ea  10x ea  P!       3 way hip  R LE WB only, 10x ea 3\" holds R LE WB only, 15x ea 3\" holds   R LE WB only, 20x 3\" holds       Prone knee flx    20x 3\" 20x 3\"                      Ther Activity                                       Gait Training             With 1 axillary crutch    CM                      Modalities                                            "

## 2024-09-05 ENCOUNTER — OFFICE VISIT (OUTPATIENT)
Dept: PHYSICAL THERAPY | Facility: MEDICAL CENTER | Age: 29
End: 2024-09-05
Payer: COMMERCIAL

## 2024-09-05 DIAGNOSIS — S83.512D RUPTURE OF ANTERIOR CRUCIATE LIGAMENT OF LEFT KNEE, SUBSEQUENT ENCOUNTER: ICD-10-CM

## 2024-09-05 DIAGNOSIS — Z47.89 AFTERCARE FOLLOWING SURGERY OF THE MUSCULOSKELETAL SYSTEM: Primary | ICD-10-CM

## 2024-09-05 DIAGNOSIS — S83.512D SPRAIN OF ANTERIOR CRUCIATE LIGAMENT OF LEFT KNEE, SUBSEQUENT ENCOUNTER: ICD-10-CM

## 2024-09-05 PROCEDURE — 97110 THERAPEUTIC EXERCISES: CPT

## 2024-09-05 PROCEDURE — 97112 NEUROMUSCULAR REEDUCATION: CPT

## 2024-09-05 NOTE — PROGRESS NOTES
Daily Note     Today's date: 2024  Patient name: Lovely Trevino  : 1995  MRN: [id number]  Referring provider: Jessica Lindsay PA-C  Dx:   Encounter Diagnosis     ICD-10-CM    1. Aftercare following surgery of the musculoskeletal system  Z47.89       2. Sprain of anterior cruciate ligament of left knee, subsequent encounter  S83.512D       3. Rupture of anterior cruciate ligament of left knee, subsequent encounter  S83.512D               Start Time: 1542  Stop Time: 1620  Total time in clinic (min): 38 minutes    Subjective: Patient reports soreness from HEP completion.      Objective: See treatment diary below    L knee PROM:  Flx: 106* P!  Ext: -11* P!    Assessment: Patient tolerated treatment fair. Emphasized importance of continuing to progress knee ROM and continuing to progress L LE WB. Mild reductions in flx ROM noted. Improved SLR noted today. Provided alternatives to passive stretching for HEP completion 2/to lack of signif improvement in ROM from existing interventions thus far. Patient educated on timing and frequency of stretching interventions. Will cont to progress as tolerated. Patient would benefit from continued PT.       Plan: Continue per plan of care.      Precautions:   Past Medical History:   Diagnosis Date    Anesthesia complication     pt has hx of waking up during procedures, if this happens she wakes up combative/violent    Stomach ulcer     Vaginal yeast infection      No Known Allergies  Eval/Re-Eval POC Expires Auth #/ Referral # Total Visits Start Date Expiration Date Extension Info Visits Limitation                                                                   1 2 3 4 5 6   8/9 8/13 8/15 8/27 8/29  9/3   7 8 9 10 11 12   /        13 14 15 16 17 18           19 20 21 22 23 24           25 26 27 28 29 30               Access Code: [email or web address]  URL: [email or web address]  Date: 2024  Prepared by: Abram Nelson    Exercises  - Standing March with Counter  "Support  - 2 x daily - 7 x weekly - 1 sets - 10 reps  - Supine Hamstring Stretch with Strap  - 1 x daily - 7 x weekly - 3 sets - 30 seconds hold  - Seated Table Hamstring Stretch  - 1 x daily - 7 x weekly - 3 sets - 30 seconds hold  - Prone Quadricep Stretch with Strap  - 1 x daily - 7 x weekly - 3 sets - 30 seconds hold  - Supine Quadriceps Stretch with Strap on Table  - 1 x daily - 7 x weekly - 3 sets - 30 seconds hold  - Prone Knee Flexion  - 1 x daily - 7 x weekly - 2 sets - 10 reps  - Supine Active Straight Leg Raise  - 1 x daily - 7 x weekly - 2 sets - 10 reps  - Sidelying Hip Abduction  - 1 x daily - 7 x weekly - 2 sets - 10 reps    Access Code: [email or web address]  URL: [email or web address]  Date: 09/05/2024  Prepared by: Abram Nelson    Exercises  - Supine Knee Extension Mobilization with Weight  - 1 x daily - 7 x weekly - 3 sets - 10 reps  - Prone Knee Extension with Ankle Weight  - 1 x daily - 7 x weekly - 3 sets - 10 reps    Manuals 8/9 8/13 8/15 8/27 8/29  9/3 9/5      L knee PROM      CM CM                                             Neuro Re-Ed             HEP review/pt education 25'            QS  2x20 3\" 2x20 3\" 2x20 3\" 2x20 3\"  2x20 3\" 2x20 3\"      Weight shifting  20x 5\" 20x 5\"          Standing march   10x P! 15x 3\" holds P! 15x 3\"  15x 3\" 20x 3\"      FTEO             HR/TR             LSU/FSU                                       Ther Ex             Heel slide  10x10\"  10x10\" 10x10\"  10x10\" 10x10\" AAROM, 10x10\" AROM      Calf stretching  3x30\" 3x30\" 3x30\" 3x30\"  3x30\" 3x30\"      HSS  3x30\" 3x30\" 3x30\" 3x30\"  3x30\" 3x30\"      Mod leonela  3x30\" 3x30 3x30\" 3x30\"  3x30\" 3x30\"      4 way SLR  P!  15x ea  10x ea  P! 2x10 ea      3 way hip  R LE WB only, 10x ea 3\" holds R LE WB only, 15x ea 3\" holds   R LE WB only, 20x 3\" holds       Prone knee flx    20x 3\" 20x 3\"         Heel prop       2' 10#                   Ther Activity                                       Gait Training             With 1 " axillary crutch    CM                      Modalities

## 2024-09-19 ENCOUNTER — OFFICE VISIT (OUTPATIENT)
Dept: PHYSICAL THERAPY | Facility: MEDICAL CENTER | Age: 29
End: 2024-09-19
Payer: COMMERCIAL

## 2024-09-19 DIAGNOSIS — S83.512D RUPTURE OF ANTERIOR CRUCIATE LIGAMENT OF LEFT KNEE, SUBSEQUENT ENCOUNTER: ICD-10-CM

## 2024-09-19 DIAGNOSIS — Z47.89 AFTERCARE FOLLOWING SURGERY OF THE MUSCULOSKELETAL SYSTEM: Primary | ICD-10-CM

## 2024-09-19 DIAGNOSIS — S83.512D SPRAIN OF ANTERIOR CRUCIATE LIGAMENT OF LEFT KNEE, SUBSEQUENT ENCOUNTER: ICD-10-CM

## 2024-09-19 PROCEDURE — 97116 GAIT TRAINING THERAPY: CPT

## 2024-09-19 PROCEDURE — 97110 THERAPEUTIC EXERCISES: CPT

## 2024-09-19 PROCEDURE — 97112 NEUROMUSCULAR REEDUCATION: CPT

## 2024-09-19 NOTE — PROGRESS NOTES
PT Evaluation     Today's date: 2024  Patient name: Lovely Trevino  : 1995  MRN: [id number]  Referring provider: Jessica Lindsay PA-C  Dx:   Encounter Diagnosis     ICD-10-CM    1. Aftercare following surgery of the musculoskeletal system  Z47.89       2. Sprain of anterior cruciate ligament of left knee, subsequent encounter  S83.512D       3. Rupture of anterior cruciate ligament of left knee, subsequent encounter  S83.512D             Start Time: 1230  Stop Time: 1310  Total time in clinic (min): 40 minutes    Assessment  Impairments: abnormal gait, abnormal or restricted ROM, abnormal movement, activity intolerance, impaired physical strength, lacks appropriate home exercise program, pain with function and weight-bearing intolerance    Assessment details: 24: Lovely Trevino is a pleasant 28 y.o. female who presents s/p L ACL reconstruction 24. Upon eval today, she has LE WB intolerance, LE activity intolerance, abnormal gait  and decreased strength and ROM of the L LE as compared with the R LE resulting in worry over not knowing what's wrong, fear of not being able to keep active, and pain with ADLs.  No further referral appears necessary at this time based upon examination results.  I expect she will improve within 12 weeks of skilled PT.  Positive prognostic indicators include positive attitude toward recovery, good understanding of diagnosis and treatment plan options, acuity of symptoms, absence of peripheralization, and absence of observed red flags.  Negative prognostic indicators include obesity.  During today's session, the patient was provided with education surrounding precautions, flat ground amb with AD, stair negotiation with AD, possible home modifications to promote patient safety, and therapeutic exercise for the purpose of DVT prevention and preventing contracture formation. All patient questions were answered. Patient verbalized understanding of all of the  information provided.      Comparable signs:  1) WB  2) Knee ROM    Problem List:  1) LE activity intolerance  2) WB intolerance  3) Poor L LE strength  4) Payton L knee ROM  5) Abnormal gait    9/19/24: Patient presents to PT having completed 7 total sessions to date. Re-evaluation was performed and the patient has demonstrated improvements in L LE strength and ROM, functional mobility, activity and WB tolerance. Patient still presents with deficits of decreased L LE strength and ROM as compared with the R LE, poor WB tolerance, poor activity tolerance, abnormal gait, poor squatting and pain with function, resulting in difficulty with ADL performance and progression through PT POC. Patient has yet to progress to next phase of tx protocol. Per MD protocol, patient requires: 0-90* knee AROM and normalized gait for advancement to phase 2, but still demonstrates limitations in knee ROM and is unable to amb without marked deviations (see objective). Patient again educated on tx protocol and requirements for advancement. Cont to emphasize importance of continuing to progress knee ROM and continuing to progress L LE WB as a component of HEP. Provided further alternatives to passive stretching for HEP completion 2/to lack of signif improvement in ROM from existing interventions thus far. Patient educated on timing and frequency of stretching interventions. Spent more time on promoting proper gait mechanics today 2/to high difficulty and ongoing dysfunction despite no pain. Session cut short 2/to patient arriving late. Will cont to progress as tolerated. Patient scheduled to f/u with ortho 9/25/24.     Understanding of Dx/Px/POC: excellent     Prognosis: excellent    Goals  Impairment based goals  Patient will demonstrate improvements in L LE strength that are consistent with current phase of tx protocol. - progressing  Patient will demonstrate improvements in L LE ROM that are consistent with current phase of tx protocol. -  progressing  Patient will consistently demonstrate normal squatting mechanics during a tx session centered around improving LE strength and power. - not met  Patient will improve L knee strength to 5/5 in all planes by time of d/c in order to improve ease and stability with functional mobility. - not met      Functional based goals to be completed by time of d/c  Patient will resume all ADLs and work duties with min c/o L LE pain. - not met  Patient will improve FOTO to greater than predicted value. - not met  Patient will be independent with home exercise program. - not met  Patient will be able to manage symptoms independently. - not met    Plan    Planned therapy interventions: abdominal trunk stabilization, activity modification, ADL training, balance/weight bearing training, gait training, home exercise program, therapeutic exercise, therapeutic activities, stretching, strengthening, patient education, neuromuscular re-education, postural training, therapeutic training, joint mobilization, IASTM, kinesiology taping, manual therapy, massage, Diana taping, motor coordination training, muscle pump exercises, nerve gliding, self care, work reintegration, fine motor coordination training, flexibility, functional ROM exercises, graded activity, graded exercise, graded motor, IADL retraining, balance, behavior modification, body mechanics training, breathing training, transfer training and coordination    Frequency: 2x week  Duration in weeks: 12  Treatment plan discussed with: patient        Subjective Evaluation    History of Present Illness  Mechanism of injury: 8/9/24: Lovely Trevino is 28 y.o. female presenting to PT s/p L ACL reconstruction 8/1/24.    9/19/24: Patient presents to PT having completed 7 total sessions to date. Patient presents following 2 week absence from PT but reports compliance with HEP. Patient presents with 1 axillary crutch for amb. Patient reports return to work, denies any  complications.  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[employer])        Objective     Concurrent Complaints  Negative for night pain, disturbed sleep, bladder dysfunction, bowel dysfunction, saddle (S4) numbness, cardiac problem, kidney problem, gallbladder problem, stomach problem, ulcer, appendix problem, spleen problem, pancreas problem, history of cancer, history of trauma and infection    Neurological Testing     Sensation     Lumbar   Left   Intact: light touch    Right   Intact: light touch    Hip   Left Hip   Intact: light touch    Right Hip   Intact: light touch    Knee   Left Knee   Intact: Light touch    Right Knee   Intact: light touch     Reflexes   Left   Babinski sign: negative    Right   Patellar (L4): normal (2+)  Achilles (S1): normal (2+)  Babinski sign: negative    Active Range of Motion   Left Knee   Flexion: 91 degrees with pain  Extension: -10 degrees with pain    Right Knee   Normal active range of motion    Passive Range of Motion   Left Knee   Flexion: 102 degrees with pain  Extension: -8 degrees with pain    Right Knee   Normal passive range of motion    Strength/Myotome Testing     Left Hip   Planes of Motion   Flexion: 4+  Extension: 4+  Abduction: 4+    Right Hip   Normal muscle strength    Left Knee   Flexion: 4+  Left knee extension strength: DNT (surgical precautions indiciate no open chain quad strengthening for 3 months)    Right Knee   Normal strength    Left Ankle/Foot   Dorsiflexion: 5    Right Ankle/Foot   Dorsiflexion:  5    Additional Strength Details  Can perform SLR without quad leg but unable to perform in TKE... patient demonstrates ~-10* knee ext AROM    Ambulation     Comments   1 axillary crutch, absent heel strike, decreased step and stride length, antalgic L LE, insufficient hip and knee flexion, absent arm swing             Precautions:   Past Medical History:   Diagnosis Date    Anesthesia complication     pt has hx of waking up during procedures, if this happens she wakes up combative/violent    Stomach ulcer     Vaginal yeast infection      No Known Allergies  Eval/Re-Eval POC Expires Auth #/ Referral # Total Visits Start Date Expiration Date Extension Info Visits Limitation                                                                                    1 2 3 4 5 6   8/9 8/13 8/15 8/27 8/29  9/3   7 8 9 10 11 12   9/5 9/19 (RE)            13 14 15 16 17 18                 19 20 21 22 23 24                 25 26 27 28 29 30                       Access Code: [email or web address]  URL: [email or web address]  Date: 08/27/2024  Prepared by: Abram Nelson     Exercises  - Standing March with Counter Support  - 2 x daily - 7 x weekly - 1 sets - 10 reps  - Supine Hamstring Stretch with Strap  - 1 x daily - 7 x weekly - 3 sets - 30 seconds hold  - Seated Table Hamstring Stretch  - 1 x daily - 7 x weekly - 3 sets - 30 seconds hold  - Prone Quadricep Stretch with Strap  - 1 x daily - 7 x weekly - 3 sets - 30 seconds hold  - Supine Quadriceps Stretch with Strap on Table  - 1 x daily - 7 x weekly - 3 sets - 30 seconds hold  - Prone Knee Flexion  - 1 x daily - 7 x weekly - 2 sets - 10 reps  - Supine Active Straight Leg Raise  - 1 x daily - 7 x weekly - 2 sets - 10 reps  - Sidelying Hip Abduction  - 1 x daily - 7 x weekly - 2 sets - 10 reps     Access Code: [email or web address]  URL: [email or web address]  Date: 09/05/2024  Prepared by: Abram Nelson     Exercises  - Supine Knee Extension Mobilization with Weight  - 1 x daily -  "7 x weekly - 3 sets - 10 reps  - Prone Knee Extension with Ankle Weight  - 1 x daily - 7 x weekly - 3 sets - 10 reps     Manuals 8/29  9/3 9/5  9/19       L knee PROM   CM CM                                                         Neuro Re-Ed               HEP review/pt education        10'       QS 2x20 3\"  2x20 3\" 2x20 3\"  2x20 3\"       Weight shifting               Standing march 15x 3\"  15x 3\" 20x 3\"         FTEO               HR/TR               LSU/FSU                                               Ther Ex               Heel slide 10x10\"  10x10\" 10x10\" AAROM, 10x10\" AROM 10x10\" AAROM, 10x10\" AROM       Calf stretching 3x30\"  3x30\" 3x30\"  3x30\"       HSS 3x30\"  3x30\" 3x30\"  3x30\"       Mod leonela 3x30\"  3x30\" 3x30\"  3x30\"       4 way SLR 10x ea  P! 2x10 ea         3 way hip   R LE WB only, 20x 3\" holds           Prone knee flx 20x 3\"              Heel prop     2' 10#                         Ther Activity                                               Gait Training               With 1 axillary crutch        10'                               Modalities                                                  "

## 2024-09-23 ENCOUNTER — OFFICE VISIT (OUTPATIENT)
Dept: PHYSICAL THERAPY | Facility: MEDICAL CENTER | Age: 29
End: 2024-09-23
Payer: COMMERCIAL

## 2024-09-23 DIAGNOSIS — S83.512D RUPTURE OF ANTERIOR CRUCIATE LIGAMENT OF LEFT KNEE, SUBSEQUENT ENCOUNTER: ICD-10-CM

## 2024-09-23 DIAGNOSIS — S83.512D SPRAIN OF ANTERIOR CRUCIATE LIGAMENT OF LEFT KNEE, SUBSEQUENT ENCOUNTER: ICD-10-CM

## 2024-09-23 DIAGNOSIS — Z47.89 AFTERCARE FOLLOWING SURGERY OF THE MUSCULOSKELETAL SYSTEM: Primary | ICD-10-CM

## 2024-09-23 PROCEDURE — 97116 GAIT TRAINING THERAPY: CPT

## 2024-09-23 PROCEDURE — 97110 THERAPEUTIC EXERCISES: CPT

## 2024-09-23 NOTE — PROGRESS NOTES
Daily Note     Today's date: 2024  Patient name: Lovely Trevino  : 1995  MRN: [id number]  Referring provider: Jessica Lindsay PA-C  Dx:   Encounter Diagnosis     ICD-10-CM    1. Aftercare following surgery of the musculoskeletal system  Z47.89       2. Sprain of anterior cruciate ligament of left knee, subsequent encounter  S83.512D       3. Rupture of anterior cruciate ligament of left knee, subsequent encounter  S83.512D           Start Time: 1300  Stop Time: 1340  Total time in clinic (min): 40 minutes    Subjective: Patient reports soreness upon arrival. Presents with axillary crutch for amb.      Objective: See treatment diary below      Assessment: Tolerated treatment well. Cont to place emphasis on the importance of weaning from AD usage. Spent more time on gait mechanics today 2/to ongoing deficits of absent arm swing, absent heel strike, poor propulsion, antalgia, poor hip flexion, poor knee flexion and mild trendelenburg. Used hurdles for visual cueing to promote increased hip flexion with improvements in amb following. No improvements in ROM noted today. Patient demonstrated improvements in SLR, able to perform in unlocked brace without lag. Patient to f/u with ortho 24. PPU added to address posterior hip soreness post session, patient reported improved sx following. Patient would benefit from continued PT      Plan: Continue per plan of care.      Precautions:   Past Medical History:   Diagnosis Date    Anesthesia complication     pt has hx of waking up during procedures, if this happens she wakes up combative/violent    Stomach ulcer     Vaginal yeast infection      No Known Allergies  Eval/Re-Eval POC Expires Auth #/ Referral # Total Visits Start Date Expiration Date Extension Info Visits Limitation                                                                                    1 2 3 4 5 6   8/9 8/13 8/15 8/27 8/29  9/3   7 8 9 10 11 12   9/ (RE)           13 14 15 16  "17 18                 19 20 21 22 23 24                 25 26 27 28 29 30                       Access Code: [email or web address]  URL: [email or web address]  Date: 08/27/2024  Prepared by: Abram Nelson     Exercises  - Standing March with Counter Support  - 2 x daily - 7 x weekly - 1 sets - 10 reps  - Supine Hamstring Stretch with Strap  - 1 x daily - 7 x weekly - 3 sets - 30 seconds hold  - Seated Table Hamstring Stretch  - 1 x daily - 7 x weekly - 3 sets - 30 seconds hold  - Prone Quadricep Stretch with Strap  - 1 x daily - 7 x weekly - 3 sets - 30 seconds hold  - Supine Quadriceps Stretch with Strap on Table  - 1 x daily - 7 x weekly - 3 sets - 30 seconds hold  - Prone Knee Flexion  - 1 x daily - 7 x weekly - 2 sets - 10 reps  - Supine Active Straight Leg Raise  - 1 x daily - 7 x weekly - 2 sets - 10 reps  - Sidelying Hip Abduction  - 1 x daily - 7 x weekly - 2 sets - 10 reps     Access Code: [email or web address]  URL: [email or web address]  Date: 09/05/2024  Prepared by: Abram Nelson     Exercises  - Supine Knee Extension Mobilization with Weight  - 1 x daily - 7 x weekly - 3 sets - 10 reps  - Prone Knee Extension with Ankle Weight  - 1 x daily - 7 x weekly - 3 sets - 10 reps     Manuals 8/29  9/3 9/5  9/19  9/23     L knee PROM   CM CM                                                         Neuro Re-Ed               HEP review/pt education        10'       QS 2x20 3\"  2x20 3\" 2x20 3\"  2x20 3\"      Weight shifting               Standing march 15x 3\"  15x 3\" 20x 3\"         FTEO               HR/TR               LSU/FSU                                               Ther Ex               Heel slide 10x10\"  10x10\" 10x10\" AAROM, 10x10\" AROM 10x10\" AAROM, 10x10\" AROM       Calf stretching 3x30\"  3x30\" 3x30\"  3x30\"  3x30\"     HSS 3x30\"  3x30\" 3x30\"  3x30\"  3x30\"     Mod leonela 3x30\"  3x30\" 3x30\"  3x30\"  3x30\"     4 way SLR 10x ea  P! 2x10 ea    2x10 ea     3 way hip   R LE WB only, 20x 3\" holds           QS bolster " "at feet     20x 3\"    PPU     15x 3\"    Prone knee flx 20x 3\"              Heel prop     2' 10#                         Ther Activity                                               Gait Training               With 1 axillary crutch        10'      With no AD, stepping over hurdles     10'                    Modalities                                                       "

## 2024-09-25 ENCOUNTER — OFFICE VISIT (OUTPATIENT)
Dept: OBGYN CLINIC | Facility: MEDICAL CENTER | Age: 29
End: 2024-09-25

## 2024-09-25 VITALS
HEART RATE: 84 BPM | BODY MASS INDEX: 38.27 KG/M2 | DIASTOLIC BLOOD PRESSURE: 69 MMHG | HEIGHT: 65 IN | SYSTOLIC BLOOD PRESSURE: 100 MMHG

## 2024-09-25 DIAGNOSIS — S83.512A RUPTURE OF ANTERIOR CRUCIATE LIGAMENT OF LEFT KNEE, INITIAL ENCOUNTER: Primary | ICD-10-CM

## 2024-09-25 PROCEDURE — 99024 POSTOP FOLLOW-UP VISIT: CPT | Performed by: ORTHOPAEDIC SURGERY

## 2024-09-25 NOTE — PROGRESS NOTES
"Knee Post Operative Visit     Assesment:     29 y.o. female 8 weeks s/p surgical arthroscopy of the left knee with ACL quadriceps autograft reconstruction, partial medial meniscectomy, DOS: 08/01/2024.    Plan:    Post-Operative treatment:    Ice to knee for 20 minutes at least 1-2 times daily.  Continue PT per Dr. Kaminski's protocol.  OTC NSAIDs or Tylenol for pain.    Discontinue brace and crutches over next 1 to 2 weeks    Encouraged aggressive obtaining full extension with prone leg hangs and forcing extension by pushing down on distal quads with heel propped up.  Also encouraged forcing flexion to obtain full motion and avoid any need for manipulation under anesthesia.  If she still lacks extension by the next office visit may consider MRI to rule out cyclops lesion.    Chief Complaint   Patient presents with    Left Knee - Post-op     Will use tylenol and Ibuprofen as needed. Flexibility is hard.       History of Present Illness:    The patient is a 29 y.o. female who is being evaluated post operatively 7 weeks s/p surgical arthroscopy of the left knee with ACL quadriceps autograft reconstruction, partial medial meniscectomy, DOS: 08/01/2024.    Since the prior visit, She reports minimal improvement. She is ambulating with walker.    Pain is well controlled. Patient is taking oxycodone as needed for pain. The patient is using ice to control swelling.    She has been working on getting her range of motion back.  She notes she does have significant quad strength improving.  She has been doing physical therapy.  No new injuries.      I have reviewed the past medical, surgical, social and family history, medications and allergies as documented in the EMR.    Review of systems: ROS is negative other than that noted in the HPI.  Constitutional: Negative for fatigue and fever.        Physical Exam:    Blood pressure 100/69, pulse 84, height 5' 5\" (1.651 m), not currently breastfeeding.    General/Constitutional: " NAD, well developed, well nourished  HENT: Normocephalic, atraumatic  CV: Intact distal pulses, regular rate  Resp: No respiratory distress or labored breathing  Lymphatic: No lymphadenopathy palpated  Neuro: Alert and Oriented x 3, no focal deficits  Psych: Normal mood, normal affect, normal judgement, normal behavior  Skin: Warm, dry, no rashes, no erythema      Knee Exam (focused):                  RIGHT LEFT   ROM:   0-130 5-105   Palpation: Effusion negative small     MJL tenderness Negative Positive     LJL tenderness Negative Positive   Instability: Varus stable stable     Valgus stable stable   Special Tests: Lachman Negative Negative     Posterior drawer Negative Negative     Anterior drawer Negative Negative     Pivot shift not tested not tested     Dial not tested not tested   Patella: Palpation no tenderness no tenderness     Mobility 1/4 1/4     Apprehension Negative Negative   Other: Single leg 1/4 squat not tested not tested      Incisions show no erythema, no drainage    LE NV Exam: +2 DP/PT pulses bilaterally  Sensation intact to light touch L2-S1 bilaterally     Bilateral hip ROM demonstrates no pain actively or passively    No calf tenderness to palpation bilaterally    Scribe Attestation      I,:   am acting as a scribe while in the presence of the attending physician.:       I,:   personally performed the services described in this documentation    as scribed in my presence.:

## 2024-10-04 NOTE — PROGRESS NOTES
Patient has not been heard from since prior tx session and has cancelled 6 tx sessions to date. Patient to be d/c from PT at this time.

## 2024-11-14 ENCOUNTER — TELEMEDICINE (OUTPATIENT)
Dept: OTHER | Facility: HOSPITAL | Age: 29
End: 2024-11-14
Payer: COMMERCIAL

## 2024-11-14 DIAGNOSIS — J20.9 ACUTE BRONCHITIS, UNSPECIFIED ORGANISM: Primary | ICD-10-CM

## 2024-11-14 PROCEDURE — 99213 OFFICE O/P EST LOW 20 MIN: CPT | Performed by: NURSE PRACTITIONER

## 2024-11-14 RX ORDER — AZITHROMYCIN 250 MG/1
TABLET, FILM COATED ORAL
Qty: 6 TABLET | Refills: 0 | Status: SHIPPED | OUTPATIENT
Start: 2024-11-14 | End: 2024-11-18

## 2024-11-14 NOTE — PROGRESS NOTES
Virtual Regular Visit  Name: Lovely Trevino      : 1995      MRN: [id number]  Encounter Provider: Your Video Visit Provider  Encounter Date: 2024   Encounter department: VIRTUAL CARE     Verification of patient location:    Patient is located at Home in the following state in which I hold an active license [place]    :  Assessment & Plan  Acute bronchitis, unspecified organism    Orders:    azithromycin (ZITHROMAX) 250 mg tablet; Take 2 tablets today then 1 tablet daily x 4 days        Encounter provider Your Video Visit Provider    The patient was identified by name and date of birth. Lovely Trevino was informed that this is a telemedicine visit and that the visit is being conducted through the Epic Embedded platform. She agrees to proceed..  My office door was closed. No one else was in the room.  She acknowledged consent and understanding of privacy and security of the video platform. The patient has agreed to participate and understands they can discontinue the visit at any time.    Patient is aware this is a billable service.     History of Present Illness History of Present Illness     This is a 29 year old female here today for video visit.  She has been cough up green mucous for the last few days.  He was starting to feel better but now symptoms returned.  No chest pain or sob.  She has some discomfort with cough.  She states symptoms stasrted about 5 d ays ago. Home covid test was negative.       History obtained from: patient  Review of Systems   Constitutional:  Positive for fatigue. Negative for activity change and fever.   HENT:  Positive for congestion.    Respiratory:  Positive for cough. Negative for shortness of breath and wheezing.         Rattling in her chest.   Neurological: Negative.    Psychiatric/Behavioral: Negative.       Objective     There were no vitals taken for this visit.  Physical Exam  Constitutional:       General: She is not in acute distress.      Appearance: Normal appearance. She is not ill-appearing or toxic-appearing.   HENT:      Head: Normocephalic and atraumatic.      Nose: Congestion present.   Pulmonary:      Effort: Pulmonary effort is normal. No respiratory distress.   Neurological:      Mental Status: She is alert and oriented to person, place, and time.   Psychiatric:         Mood and Affect: Mood normal.         Behavior: Behavior normal.         Thought Content: Thought content normal.         Judgment: Judgment normal.         Visit Time  Total Visit Duration: 7

## 2024-11-14 NOTE — PATIENT INSTRUCTIONS
Rest and drink extra fluids.  Start antibiotic.  Take probiotic.  Cough medication as prescribed.  Cool mist humidification can be helpful.  Follow up with PCP if no improvement.  Go to ER with worsening symptoms.   Chest xray ordered.   Acute Bronchitis   AMBULATORY CARE:   Acute bronchitis  is swelling and irritation in the air passages of your lungs. This irritation may cause you to cough or have other breathing problems. Acute bronchitis often starts because of another illness, such as a cold or the flu. The illness spreads from your nose and throat to your windpipe and airways. Bronchitis is often called a chest cold. Acute bronchitis lasts about 3 to 6 weeks and is usually not a serious illness. Your cough can last for several weeks.   You may have any of the following symptoms:   A cough with sputum that may be clear, yellow, or green    Feeling more tired than usual, and body aches    A fever and chills    Wheezing when you breathe    A tight chest or pain when you breathe or cough  Seek care immediately if:   You cough up blood.    Your lips or fingernails turn blue.    You feel like you are not getting enough air when you breathe.  Contact your healthcare provider if:   You have a fever.    Your breathing problems do not go away or get worse.    Your cough does not get better within 4 weeks.    You have questions or concerns about your condition or care.  Self-care:   Get more rest.  Rest helps your body to heal. Slowly start to do more each day. Rest when you feel it is needed.    Avoid irritants in the air.  Avoid chemicals, fumes, and dust. Wear a face mask if you must work around dust or fumes. Stay inside on days when air pollution levels are high. If you have allergies, stay inside when pollen counts are high. Do not use aerosol products, such as spray-on deodorant, bug spray, and hair spray.    Do not smoke or be around others who smoke.  Nicotine and other chemicals in cigarettes and cigars damages  the cilia that move mucus out of your lungs. Ask your healthcare provider for information if you currently smoke and need help to quit. E-cigarettes or smokeless tobacco still contain nicotine. Talk to your healthcare provider before you use these products.     Drink liquids as directed.  Liquids help keep your air passages moist and help you cough up mucus. You may need to drink more liquids when you have acute bronchitis. Ask how much liquid to drink each day and which liquids are best for you.    Use a humidifier or vaporizer.  Use a cool mist humidifier or a vaporizer to increase air moisture in your home. This may make it easier for you to breathe and help decrease your cough.  Prevent acute bronchitis by doing the following:   Get the vaccinations you need.  Ask your healthcare provider if you should get vaccinated against the flu or pneumonia.    Prevent the spread of germs.  You can decrease your risk of acute bronchitis and other illnesses by doing the following:     Wash your hands often with soap and water. Carry germ-killing hand lotion or gel with you. You can use the lotion or gel to clean your hands when soap and water are not available.    Do not touch your eyes, nose, or mouth unless you have washed your hands first.    Always cover your mouth when you cough to prevent the spread of germs. It is best to cough into a tissue or your shirt sleeve instead of into your hand. Ask those around you cover their mouths when they cough.    Try to avoid people who have a cold or the flu. If you are sick, stay away from others as much as possible.  Medicines:  Your healthcare provider may  give you any of the following:  Ibuprofen or acetaminophen  are medicines that help lower your fever. They are available without a doctor's order. Ask your healthcare provider which medicine is right for you. Ask how much to take and how often to take it. Follow directions. These medicines can cause stomach bleeding if not  taken correctly. Ibuprofen can cause kidney damage. Do not take ibuprofen if you have kidney disease, an ulcer, or allergies to aspirin. Acetaminophen can cause liver damage. Do not take more than 4,000 milligrams in 24 hours.     Decongestants  help loosen mucus in your lungs and make it easier to cough up. This can help you breathe easier.    Cough suppressants  decrease your urge to cough. If your cough produces mucus, do not take a cough suppressant unless your healthcare provider tells you to. Your healthcare provider may suggest that you take a cough suppressant at night so you can rest.    Inhalers  may be given. Your healthcare provider may give you one or more inhalers to help you breathe easier and cough less. An inhaler gives your medicine to open your airways. Ask your healthcare provider to show you how to use your inhaler correctly.       Follow up with your healthcare provider as directed:  Write down questions you have so you will remember to ask them during your follow-up visits.  © 2017 [employer] Information is for End User's use only and may not be sold, redistributed or otherwise used for commercial purposes. All illustrations and images included in CareNotes® are the copyrighted property of [employer] or [employer].  The above information is an  only. It is not intended as medical advice for individual conditions or treatments. Talk to your doctor, nurse or pharmacist before following any medical regimen to see if it is safe and effective for you.

## 2024-11-20 ENCOUNTER — OFFICE VISIT (OUTPATIENT)
Dept: OBGYN CLINIC | Facility: MEDICAL CENTER | Age: 29
End: 2024-11-20
Payer: COMMERCIAL

## 2024-11-20 VITALS
BODY MASS INDEX: 36.32 KG/M2 | DIASTOLIC BLOOD PRESSURE: 77 MMHG | SYSTOLIC BLOOD PRESSURE: 118 MMHG | HEIGHT: 65 IN | WEIGHT: 218 LBS | HEART RATE: 81 BPM

## 2024-11-20 DIAGNOSIS — S83.512D RUPTURE OF ANTERIOR CRUCIATE LIGAMENT OF LEFT KNEE, SUBSEQUENT ENCOUNTER: Primary | ICD-10-CM

## 2024-11-20 PROCEDURE — 99213 OFFICE O/P EST LOW 20 MIN: CPT | Performed by: ORTHOPAEDIC SURGERY

## 2024-11-20 NOTE — PROGRESS NOTES
Ortho Sports Medicine Knee Follow Up Visit     Assesment:     29 y.o. female 18 weeks postop from arthroscopic repair of left ACL with quadriceps autograft who continues inability to fully extend her left knee suggestive of possible Cyclops Lesion causing a mechanical block. DOS: 8/1/24    Plan:  Plan for MRI to evaluate for cyclops lesion  Follow-up status post MRI  If Cyclops Lesion, will plan for resection of lesion     No surgery is recommended at this point, continue with conservative treatment plan as noted.    We will proceed forward with surgical arthroscopy of the knee with resection of Cyclops Lesion causing mechanical block is confirmed on MRI.  This was discussed with patient and she is agreeable to plan.  Will revisit discussion after completion of MRI    Post-Operative treatment:    PT for ROM/strengthening to knee, hip and core.  OTC NSAIDS prn for pain.  Tylenol for pain.    Imaging:    No imaging was available for review today.    Weight bearing:  as tolerated     ROM:  Full    Brace:  No brace needed    DVT Prophylaxis:  Ambulation    Follow up:   After completion of MRI    Patient was advised that if they have any fevers, chills, chest pain, shortness of breath, redness or drainage from the incision, please let our office know immediately.      Chief Complaint   Patient presents with    Left Knee - Follow-up       History of Present Illness:    The patient is returns for follow up of arthroscopic reconstruction of her left ACL with quadriceps allograft and meniscectomy (DOS: 08/01/2024).  Since the prior visit, she reports she has continued her physical therapy using a virtual application called [employer].  She states that she has seen significant improvement in her strength, however continues to feel that there is a mechanical block preventing her from fully extending at her left knee.      Pain is located anterior, medial.     Pain is improved by rest and NSAIDS.  Pain is aggravated by extending her  knee.  Symptoms include sensation of mechanical block.     The patient has tried rest, ice, NSAIDS, and physical therapy.          Knee Surgical History:  8/1/24     Past Medical, Social and Family History:  Past Medical History:   Diagnosis Date    Anesthesia complication     pt has hx of waking up during procedures, if this happens she wakes up combative/violent    Stomach ulcer     Vaginal yeast infection      Past Surgical History:   Procedure Laterality Date    COLONOSCOPY  12/2016    Dr Phoenix. Diarrhea and melena.  Scope advanced to hepatic flexure, unable to advance ascending colon due to looping.  Transverse colon, descending colon and sigmoid colon and rectum were significant for mild patchy colitis and proctitis, no pathology.    EGD  12/2016    Healing esophageal ulcer, esophagitis, biopsy showed hyperplastic squamous mucosa showing nonspecific patchy and mild chronic inflammation features consistent with GERD.    PA ARTHRS AIDED ANT CRUCIATE LIGM RPR/AGMNTJ/RCNSTJ Left 8/1/2024    Procedure: ARTHROSCOPIC RECONSTRUCTION ANTERIOR CRUCIATE LIGAMENT (ACL) WITH AUTOGRAFT- quadiceps autografts;  Surgeon: Papa Kaminski DO;  Location: [facility];  Service: Orthopedics    PA ARTHRS KNE SURG W/MENISCECTOMY MED/LAT W/SHVG Left 8/1/2024    Procedure: MENISCECTOMY LATERAL /MEDIAL;  Surgeon: Papa Kaminski DO;  Location: [facility];  Service: Orthopedics    THUMB ARTHROSCOPY      UPPER GASTROINTESTINAL ENDOSCOPY      WISDOM TOOTH EXTRACTION N/A      No Known Allergies  Current Outpatient Medications on File Prior to Visit   Medication Sig Dispense Refill    acetaminophen (TYLENOL) 500 mg tablet Take 500 mg by mouth every 6 (six) hours as needed for mild pain      FAMOTIDINE PO Take by mouth if needed      Multiple Vitamin (MULTIVITAMIN PO) Take by mouth      Omeprazole Magnesium (PRILOSEC PO) Take by mouth if needed      aspirin 500 MG buffered tablet Take 500 mg by mouth every 6 (six) hours as needed for mild  pain For pain from ER      aspirin 81 mg chewable tablet Chew 1 tablet (81 mg total) 2 (two) times a day 84 tablet 0    Bioflavonoid Products (BIOFLEX PO) Take by mouth      multivitamin (THERAGRAN) TABS Take 1 tablet by mouth daily      norgestimate-ethinyl estradiol (ORTHO TRI-CYCLEN LO) 0.18/0.215/0.25 MG-25 MCG per tablet Take 1 tablet by mouth daily 28 tablet 2    nystatin (MYCOSTATIN) powder APPLY TO RED AREA BETWEEN BREASTS 3x PER DAY UNTIL HEALING IS COMPLETE 15 g 0    oxyCODONE (Roxicodone) 5 immediate release tablet Take 1 tablet (5 mg total) by mouth every 4 (four) hours as needed for moderate pain Max Daily Amount: 30 mg (Patient not taking: Reported on 9/25/2024) 10 tablet 0     No current facility-administered medications on file prior to visit.     Social History     Socioeconomic History    Marital status: Single     Spouse name: Not on file    Number of children: Not on file    Years of education: Not on file    Highest education level: Not on file   Occupational History    Occupation: practice manager   Tobacco Use    Smoking status: Never    Smokeless tobacco: Never   Vaping Use    Vaping status: Never Used   Substance and Sexual Activity    Alcohol use: Not Currently     Alcohol/week: 1.0 - 2.0 standard drink of alcohol     Types: 1 - 2 Standard drinks or equivalent per week    Drug use: Yes     Comment: CBC roll on-    Sexual activity: Yes     Partners: Male     Birth control/protection: Condom     Comment: LMP 7/25/24   Other Topics Concern    Not on file   Social History Narrative    Not on file     Social Drivers of Health     Financial Resource Strain: Not on file   Food Insecurity: Not on file   Transportation Needs: Not on file   Physical Activity: Not on file   Stress: Not on file   Social Connections: Unknown (6/18/2024)    Received from [employer]    Social [employer]     How often do you feel lonely or isolated from those around you? (Adult - for ages 18 years and over): Not on file  "  Intimate Partner Violence: Not on file   Housing Stability: Not on file         I have reviewed the past medical, surgical, social and family history, medications and allergies as documented in the EMR.    Review of systems: ROS is negative other than that noted in the HPI.  Constitutional: Negative for fatigue and fever.        Physical Exam:    Blood pressure 118/77, pulse 81, height 5' 5\" (1.651 m), weight 98.9 kg (218 lb), not currently breastfeeding.    General/Constitutional: NAD, well developed, well nourished  HENT: Normocephalic, atraumatic  CV: Intact distal pulses, regular rate  Resp: No respiratory distress or labored breathing  Lymphatic: No lymphadenopathy palpated  Neuro: Alert and Oriented x 3, no focal deficits  Psych: Normal mood, normal affect, normal judgement, normal behavior  Skin: Warm, dry, no rashes, no erythema      Knee Exam (focused):               RIGHT LEFT   ROM:   0-130    Palpation: Effusion negative negative     MJL tenderness Negative Positive     LJL tenderness Negative Negative   Meniscus: Lexi Negative Negative    Apley's Compression Negative Negative   Instability: Varus stable stable     Valgus stable stable   Special Tests: Lachman Negative Negative     Posterior drawer Negative Negative     Anterior drawer Negative Negative     Pivot shift not tested not tested     Dial not tested not tested   Patella: Palpation no tenderness no tenderness     Mobility 1/4 1/4     Apprehension Negative Negative   Other: Single leg 1/4 squat not tested not tested        Incisions show no erythema, no drainage    LE NV Exam: +2 DP/PT pulses bilaterally  Sensation intact to light touch L2-S1 bilaterally     Bilateral hip ROM demonstrates no pain actively or passively    No calf tenderness to palpation bilaterally      Scribe Attestation      I,:   am acting as a scribe while in the presence of the attending physician.:       I,:   personally performed the services described in this " documentation    as scribed in my presence.:

## 2024-12-07 ENCOUNTER — HOSPITAL ENCOUNTER (OUTPATIENT)
Dept: MRI IMAGING | Facility: HOSPITAL | Age: 29
Discharge: HOME | End: 2024-12-07
Payer: COMMERCIAL

## 2024-12-07 DIAGNOSIS — S83.512D RUPTURE OF ANTERIOR CRUCIATE LIGAMENT OF LEFT KNEE, SUBSEQUENT ENCOUNTER: ICD-10-CM

## 2024-12-07 PROCEDURE — 73721 MRI JNT OF LWR EXTRE W/O DYE: CPT

## 2024-12-11 ENCOUNTER — OFFICE VISIT (OUTPATIENT)
Dept: OBGYN CLINIC | Facility: MEDICAL CENTER | Age: 29
End: 2024-12-11
Payer: COMMERCIAL

## 2024-12-11 VITALS
SYSTOLIC BLOOD PRESSURE: 102 MMHG | HEART RATE: 80 BPM | HEIGHT: 65 IN | BODY MASS INDEX: 37.32 KG/M2 | WEIGHT: 224 LBS | DIASTOLIC BLOOD PRESSURE: 73 MMHG

## 2024-12-11 DIAGNOSIS — M25.862 CYCLOPS LESION OF LEFT KNEE: Primary | ICD-10-CM

## 2024-12-11 PROCEDURE — 99214 OFFICE O/P EST MOD 30 MIN: CPT | Performed by: ORTHOPAEDIC SURGERY

## 2024-12-11 RX ORDER — CHLORHEXIDINE GLUCONATE ORAL RINSE 1.2 MG/ML
15 SOLUTION DENTAL ONCE
OUTPATIENT
Start: 2024-12-11 | End: 2024-12-11

## 2024-12-11 RX ORDER — ACETAMINOPHEN 325 MG/1
650 TABLET ORAL EVERY 6 HOURS PRN
OUTPATIENT
Start: 2024-12-11

## 2024-12-11 RX ORDER — TRAMADOL HYDROCHLORIDE 50 MG/1
50 TABLET ORAL EVERY 6 HOURS SCHEDULED
OUTPATIENT
Start: 2024-12-11

## 2024-12-11 RX ORDER — CEFAZOLIN SODIUM 2 G/50ML
2000 SOLUTION INTRAVENOUS ONCE
OUTPATIENT
Start: 2024-12-11 | End: 2024-12-11

## 2024-12-11 NOTE — PROGRESS NOTES
Cyclops    Excision with possible meniscetomy    Ortho Sports Medicine Knee Follow Up Visit     Assesment:     29 y.o. female 4 months postop from arthroscopic repair of left ACL with quadriceps autograft who continues inability to fully extend her left knee with cyclops lesion appreciated on MRI causing lack of extension    Plan:    Post-Operative treatment:    PT for ROM/strengthening to knee, hip and core.  OTC NSAIDS prn for pain.  Tylenol for pain.    Discussed risks and benefits to surgical arthroscopy with synovectomy and removal of cyclops lesion.  All of her questions were answered.  She would like to proceed forward with the surgery noted above.  We also discussed possible meniscectomy versus repair if she happens to have a tear but this is doubtful given her MRI shows no evidence of meniscus tears      Chief Complaint   Patient presents with    Left Knee - Follow-up     MRI L knee results       History of Present Illness:    The patient is returns for follow up of arthroscopic reconstruction of her left ACL with quadriceps allograft and meniscectomy (DOS: 08/01/2024).  Since the prior visit, she reports she has continued her physical therapy using a virtual application called [employer].  She states that she has seen significant improvement in her strength, however continues to feel that there is a mechanical block preventing her from fully extending at her left knee.      Pain is located anterior, medial.     Pain is improved by rest and NSAIDS.  Pain is aggravated by extending her knee.  Symptoms include sensation of mechanical block.     The patient has tried rest, ice, NSAIDS, and physical therapy.    She obtained her MRI.  She does still note a lack of full extension.  No other injuries or new complaints at this time.      Knee Surgical History:  8/1/24     Past Medical, Social and Family History:  Past Medical History:   Diagnosis Date    Anesthesia complication     pt has hx of waking up during  procedures, if this happens she wakes up combative/violent    Stomach ulcer     Vaginal yeast infection      Past Surgical History:   Procedure Laterality Date    COLONOSCOPY  12/2016    Dr Phoenix. Diarrhea and melena.  Scope advanced to hepatic flexure, unable to advance ascending colon due to looping.  Transverse colon, descending colon and sigmoid colon and rectum were significant for mild patchy colitis and proctitis, no pathology.    EGD  12/2016    Healing esophageal ulcer, esophagitis, biopsy showed hyperplastic squamous mucosa showing nonspecific patchy and mild chronic inflammation features consistent with GERD.    [place] ARTHRS AIDED ANT CRUCIATE LIGM RPR/AGMNTJ/RCNSTJ Left 8/1/2024    Procedure: ARTHROSCOPIC RECONSTRUCTION ANTERIOR CRUCIATE LIGAMENT (ACL) WITH AUTOGRAFT- quadiceps autografts;  Surgeon: Papa Kaminski DO;  [facility]: [facility];  Service: Orthopedics    MT ARTHRS KNE SURG W/MENISCECTOMY MED/LAT W/SHVG Left 8/1/2024    Procedure: MENISCECTOMY LATERAL /MEDIAL;  Surgeon: Papa Kaminski DO;  [facility];  Service: Orthopedics    THUMB ARTHROSCOPY      UPPER GASTROINTESTINAL ENDOSCOPY      WISDOM TOOTH EXTRACTION N/A      No Known Allergies  Current Outpatient Medications on File Prior to Visit   Medication Sig Dispense Refill    acetaminophen (TYLENOL) 500 mg tablet Take 500 mg by mouth every 6 (six) hours as needed for mild pain      FAMOTIDINE PO Take by mouth if needed      Multiple Vitamin (MULTIVITAMIN PO) Take by mouth      Omeprazole Magnesium (PRILOSEC PO) Take by mouth if needed      aspirin 500 MG buffered tablet Take 500 mg by mouth every 6 (six) hours as needed for mild pain For pain from ER      aspirin 81 mg chewable tablet Chew 1 tablet (81 mg total) 2 (two) times a day 84 tablet 0    Bioflavonoid Products (BIOFLEX PO) Take by mouth      multivitamin (THERAGRAN) TABS Take 1 tablet by mouth daily      norgestimate-ethinyl estradiol (ORTHO TRI-CYCLEN LO) 0.18/0.215/0.25  MG-25 MCG per tablet Take 1 tablet by mouth daily 28 tablet 2    nystatin (MYCOSTATIN) powder APPLY TO RED AREA BETWEEN BREASTS 3x PER DAY UNTIL HEALING IS COMPLETE 15 g 0    oxyCODONE (Roxicodone) 5 immediate release tablet Take 1 tablet (5 mg total) by mouth every 4 (four) hours as needed for moderate pain Max Daily Amount: 30 mg (Patient not taking: Reported on 9/25/2024) 10 tablet 0     No current facility-administered medications on file prior to visit.     Social History     Socioeconomic History    Marital status: Single     Spouse name: Not on file    Number of children: Not on file    Years of education: Not on file    Highest education level: Not on file   Occupational History    Occupation: practice manager   Tobacco Use    Smoking status: Never    Smokeless tobacco: Never   Vaping Use    Vaping status: Never Used   Substance and Sexual Activity    Alcohol use: Not Currently     Alcohol/week: 1.0 - 2.0 standard drink of alcohol     Types: 1 - 2 Standard drinks or equivalent per week    Drug use: Yes     Comment: CBC roll on-    Sexual activity: Yes     Partners: Male     Birth control/protection: Condom     Comment: LMP 7/25/24   Other Topics Concern    Not on file   Social History Narrative    Not on file     Social Drivers of Health     Financial Resource Strain: Not on file   Food Insecurity: Not on file   Transportation Needs: Not on file   Physical Activity: Not on file   Stress: Not on file   Social Connections: Unknown (6/18/2024)    Received from [facility]     How often do you feel lonely or isolated from those around you? (Adult - for ages 18 years and over): Not on file   Intimate Partner Violence: Not on file   Housing Stability: Not on file         I have reviewed the past medical, surgical, social and family history, medications and allergies as documented in the EMR.    Review of systems: ROS is negative other than that noted in the HPI.  Constitutional: Negative for  "fatigue and fever.        Physical Exam:    Blood pressure 118/77, pulse 81, height 5' 5\" (1.651 m), weight 98.9 kg (218 lb), not currently breastfeeding.    General/Constitutional: NAD, well developed, well nourished  HENT: Normocephalic, atraumatic  CV: Intact distal pulses, regular rate  Resp: No respiratory distress or labored breathing  Lymphatic: No lymphadenopathy palpated  Neuro: Alert and Oriented x 3, no focal deficits  Psych: Normal mood, normal affect, normal judgement, normal behavior  Skin: Warm, dry, no rashes, no erythema      Knee Exam (focused):               RIGHT LEFT   ROM:   0-130    Palpation: Effusion negative negative     MJL tenderness Negative Positive     LJL tenderness Negative Negative   Meniscus: Lexi Negative Negative    Apley's Compression Negative Negative   Instability: Varus stable stable     Valgus stable stable   Special Tests: Lachman Negative Negative     Posterior drawer Negative Negative     Anterior drawer Negative Negative     Pivot shift not tested not tested     Dial not tested not tested   Patella: Palpation no tenderness no tenderness     Mobility 1/4 1/4     Apprehension Negative Negative   Other: Single leg 1/4 squat not tested not tested        Incisions show no erythema, no drainage    LE NV Exam: +2 DP/PT pulses bilaterally  Sensation intact to light touch L2-S1 bilaterally     Bilateral hip ROM demonstrates no pain actively or passively    No calf tenderness to palpation bilaterally      MRI of the left knee demonstrates intact ACL reconstruction with no meniscus tears or ACL retear.  There is however a large amount of arthrofibrosis and scar tissue in the anterior anterior intercondylar notch indicating a cyclops lesion.  I have reviewed the radiology report and agree with the impression      Scribe Attestation      I,:   am acting as a scribe while in the presence of the attending physician.:       I,:   personally performed the services described in " this documentation    as scribed in my presence.:

## 2025-01-23 ENCOUNTER — OFFICE VISIT (OUTPATIENT)
Dept: URGENT CARE | Facility: MEDICAL CENTER | Age: 30
End: 2025-01-23
Payer: COMMERCIAL

## 2025-01-23 VITALS
HEART RATE: 95 BPM | RESPIRATION RATE: 18 BRPM | SYSTOLIC BLOOD PRESSURE: 112 MMHG | OXYGEN SATURATION: 96 % | BODY MASS INDEX: 38.63 KG/M2 | WEIGHT: 232.13 LBS | TEMPERATURE: 97.9 F | DIASTOLIC BLOOD PRESSURE: 80 MMHG

## 2025-01-23 DIAGNOSIS — J34.89 SINUS PRESSURE: ICD-10-CM

## 2025-01-23 DIAGNOSIS — J06.9 VIRAL UPPER RESPIRATORY TRACT INFECTION: Primary | ICD-10-CM

## 2025-01-23 DIAGNOSIS — H69.93 EUSTACHIAN TUBE DYSFUNCTION, BILATERAL: ICD-10-CM

## 2025-01-23 DIAGNOSIS — R09.81 NASAL CONGESTION: ICD-10-CM

## 2025-01-23 PROCEDURE — 99214 OFFICE O/P EST MOD 30 MIN: CPT | Performed by: PHYSICIAN ASSISTANT

## 2025-01-23 RX ORDER — PREDNISONE 20 MG/1
20 TABLET ORAL 2 TIMES DAILY WITH MEALS
Qty: 6 TABLET | Refills: 0 | Status: SHIPPED | OUTPATIENT
Start: 2025-01-23 | End: 2025-01-26

## 2025-01-23 NOTE — PROGRESS NOTES
[facility]        NAME: Lovely Trevino is a 29 y.o. female  : 1995    MRN: [id number]  DATE: 2025  TIME: 5:34 PM    Assessment and Plan   Viral upper respiratory tract infection [J06.9]  1. Viral upper respiratory tract infection  predniSONE 20 mg tablet      2. Eustachian tube dysfunction, bilateral  predniSONE 20 mg tablet      3. Nasal congestion        4. Sinus pressure              Patient Instructions     Rx prednisone, take 1 tablet 2x per day for 3 days.  OTC Flonase, 1 spray in each nostril 2x per day for 5 days.  Motrin for discomfort.  Follow up with PCP in 3-5 days.    If tests have been performed at [facility], our office will contact you with results if changes need to be made to the care plan discussed with you at the visit. You can review your full results on [facility].     Chief Complaint     Chief Complaint   Patient presents with    Earache     Pressure in both ears for 1 week. Pt states she had a cold. Sinus pressure, runny/stuffy nose. More congested in the morning. No cough, no fever or chills. Ears feel like they are draining, pressure on top of head. Tried ear drops with no relief.          History of Present Illness       Patient is a 28 yo female who presents with bilateral ear pain with nasal and sinus congestion. She is recovering from a cold for the past 5-6 days. Denies fevers. Denies n/v/d.        Review of Systems   Review of Systems   Constitutional: Negative.    HENT:  Positive for congestion, ear pain (bilateral) and sinus pressure.    Respiratory: Negative.     Cardiovascular: Negative.    Skin: Negative.          Current Medications       Current Outpatient Medications:     acetaminophen (TYLENOL) 500 mg tablet, Take 500 mg by mouth every 6 (six) hours as needed for mild pain, Disp: , Rfl:     FAMOTIDINE PO, Take by mouth if needed, Disp: , Rfl:     Multiple Vitamin (MULTIVITAMIN PO), Take by mouth, Disp: , Rfl:     Omeprazole Magnesium  (PRILOSEC PO), Take by mouth if needed, Disp: , Rfl:     predniSONE 20 mg tablet, Take 1 tablet (20 mg total) by mouth 2 (two) times a day with meals for 3 days, Disp: 6 tablet, Rfl: 0    aspirin 500 MG buffered tablet, Take 500 mg by mouth every 6 (six) hours as needed for mild pain For pain from ER, Disp: , Rfl:     aspirin 81 mg chewable tablet, Chew 1 tablet (81 mg total) 2 (two) times a day, Disp: 84 tablet, Rfl: 0    Bioflavonoid Products (BIOFLEX PO), Take by mouth, Disp: , Rfl:     multivitamin (THERAGRAN) TABS, Take 1 tablet by mouth daily, Disp: , Rfl:     norgestimate-ethinyl estradiol (ORTHO TRI-CYCLEN LO) 0.18/0.215/0.25 MG-25 MCG per tablet, Take 1 tablet by mouth daily, Disp: 28 tablet, Rfl: 2    nystatin (MYCOSTATIN) powder, APPLY TO RED AREA BETWEEN BREASTS 3x PER DAY UNTIL HEALING IS COMPLETE, Disp: 15 g, Rfl: 0    oxyCODONE (Roxicodone) 5 immediate release tablet, Take 1 tablet (5 mg total) by mouth every 4 (four) hours as needed for moderate pain Max Daily Amount: 30 mg (Patient not taking: Reported on 9/25/2024), Disp: 10 tablet, Rfl: 0    Current Allergies     Allergies as of 01/23/2025    (No Known Allergies)            The following portions of the patient's history were reviewed and updated as appropriate: allergies, current medications, past family history, past medical history, past social history, past surgical history and problem list.     Past Medical History:   Diagnosis Date    Anesthesia complication     pt has hx of waking up during procedures, if this happens she wakes up combative/violent    Stomach ulcer     Vaginal yeast infection        Past Surgical History:   Procedure Laterality Date    COLONOSCOPY  12/2016    Dr Phoenix. Diarrhea and melena.  Scope advanced to hepatic flexure, unable to advance ascending colon due to looping.  Transverse colon, descending colon and sigmoid colon and rectum were significant for mild patchy colitis and proctitis, no pathology.    EGD   12/2016    Healing esophageal ulcer, esophagitis, biopsy showed hyperplastic squamous mucosa showing nonspecific patchy and mild chronic inflammation features consistent with GERD.    [place] ARTHRS AIDED ANT CRUCIATE LIGM RPR/AGMNTJ/RCNSTJ Left 8/1/2024    Procedure: ARTHROSCOPIC RECONSTRUCTION ANTERIOR CRUCIATE LIGAMENT (ACL) WITH AUTOGRAFT- quadiceps autografts;  Surgeon: Papa Kaminski DO;  Location: [facility];  Service: Orthopedics    [place] ARTHRS KNE SURG W/MENISCECTOMY MED/LAT W/SHVG Left 8/1/2024    Procedure: MENISCECTOMY LATERAL /MEDIAL;  Surgeon: Papa Kaminski DO;  Location: [facility];  Service: Orthopedics    THUMB ARTHROSCOPY      UPPER GASTROINTESTINAL ENDOSCOPY      WISDOM TOOTH EXTRACTION N/A        Family History   Problem Relation Age of Onset    Lung cancer Father     Prostate cancer Father     Hypothyroidism Father     Colon polyps Father          Medications have been verified.        Objective   /80   Pulse 95   Temp 97.9 °F (36.6 °C)   Resp 18   Wt 105 kg (232 lb 2 oz)   SpO2 96%   BMI 38.63 kg/m²        Physical Exam     Physical Exam  Vitals reviewed.   Constitutional:       Appearance: Normal appearance.   HENT:      Head: Normocephalic and atraumatic.      Right Ear: Tympanic membrane, ear canal and external ear normal.      Left Ear: Tympanic membrane, ear canal and external ear normal.      Ears:      Comments: + serous fluid behind TM, bilaterally     Nose: Congestion present. No rhinorrhea.      Mouth/Throat:      Mouth: Mucous membranes are moist.      Pharynx: Oropharynx is clear.   Cardiovascular:      Rate and Rhythm: Normal rate and regular rhythm.      Pulses: Normal pulses.      Heart sounds: Normal heart sounds.   Pulmonary:      Effort: Pulmonary effort is normal.      Breath sounds: Normal breath sounds.   Skin:     General: Skin is warm and dry.      Capillary Refill: Capillary refill takes less than 2 seconds.      Findings: No rash.   Neurological:      General:  No focal deficit present.      Mental Status: She is alert and oriented to person, place, and time.

## 2025-01-23 NOTE — PATIENT INSTRUCTIONS
Rx prednisone, take 1 tablet 2x per day for 3 days.  OTC Flonase, 1 spray in each nostril 2x per day for 5 days.  Motrin for discomfort.  Follow up with PCP in 3-5 days.

## 2025-03-06 ENCOUNTER — TELEPHONE (OUTPATIENT)
Dept: OBGYN CLINIC | Facility: HOSPITAL | Age: 30
End: 2025-03-06

## 2025-03-06 NOTE — TELEPHONE ENCOUNTER
Caller: Patient    Doctor: Dr. Kaminski    Reason for call: Patient had signed consents for surgery but had postponed because she was going to be moving. That is done now and she would like to schedule her surgery. Please call patient. Thank you.    Call back#: [phone]

## 2025-03-07 ENCOUNTER — ANESTHESIA EVENT (OUTPATIENT)
Age: 30
End: 2025-03-07
Payer: COMMERCIAL

## 2025-03-20 ENCOUNTER — ANESTHESIA (OUTPATIENT)
Age: 30
End: 2025-03-20
Payer: COMMERCIAL

## 2025-03-21 ENCOUNTER — HOSPITAL ENCOUNTER (OUTPATIENT)
Age: 30
Setting detail: OUTPATIENT SURGERY
Discharge: HOME | End: 2025-03-21
Attending: ORTHOPAEDIC SURGERY | Admitting: ORTHOPAEDIC SURGERY
Payer: COMMERCIAL

## 2025-03-21 VITALS
SYSTOLIC BLOOD PRESSURE: 93 MMHG | TEMPERATURE: 97.5 F | RESPIRATION RATE: 16 BRPM | WEIGHT: 238 LBS | BODY MASS INDEX: 38.25 KG/M2 | DIASTOLIC BLOOD PRESSURE: 52 MMHG | HEART RATE: 92 BPM | HEIGHT: 66 IN | OXYGEN SATURATION: 95 %

## 2025-03-21 DIAGNOSIS — M25.862 CYCLOPS LESION OF LEFT KNEE: Primary | ICD-10-CM

## 2025-03-21 DIAGNOSIS — Z98.890 S/P LEFT KNEE ARTHROSCOPY: ICD-10-CM

## 2025-03-21 LAB
EXT PREGNANCY TEST URINE: NEGATIVE
EXT. CONTROL: NORMAL

## 2025-03-21 PROCEDURE — NC001 PR NO CHARGE: Performed by: ORTHOPAEDIC SURGERY

## 2025-03-21 PROCEDURE — 88304 TISSUE EXAM BY PATHOLOGIST: CPT | Performed by: PATHOLOGY

## 2025-03-21 PROCEDURE — 29875 ARTHRS KNEE SURG SYNVCT LMTD: CPT | Performed by: PHYSICIAN ASSISTANT

## 2025-03-21 PROCEDURE — 81025 URINE PREGNANCY TEST: CPT | Performed by: STUDENT IN AN ORGANIZED HEALTH CARE EDUCATION/TRAINING PROGRAM

## 2025-03-21 PROCEDURE — 29875 ARTHRS KNEE SURG SYNVCT LMTD: CPT | Performed by: ORTHOPAEDIC SURGERY

## 2025-03-21 RX ORDER — HYDROMORPHONE HCL/PF 1 MG/ML
0.5 SYRINGE (ML) INJECTION
Status: DISCONTINUED | OUTPATIENT
Start: 2025-03-21 | End: 2025-03-21 | Stop reason: HOSPADM

## 2025-03-21 RX ORDER — FENTANYL CITRATE 50 UG/ML
INJECTION, SOLUTION INTRAMUSCULAR; INTRAVENOUS AS NEEDED
Status: DISCONTINUED | OUTPATIENT
Start: 2025-03-21 | End: 2025-03-21

## 2025-03-21 RX ORDER — OXYCODONE HYDROCHLORIDE 5 MG/1
5 TABLET ORAL EVERY 4 HOURS PRN
Refills: 0 | Status: DISCONTINUED | OUTPATIENT
Start: 2025-03-21 | End: 2025-03-21 | Stop reason: HOSPADM

## 2025-03-21 RX ORDER — DEXAMETHASONE SODIUM PHOSPHATE 10 MG/ML
INJECTION, SOLUTION INTRAMUSCULAR; INTRAVENOUS AS NEEDED
Status: DISCONTINUED | OUTPATIENT
Start: 2025-03-21 | End: 2025-03-21

## 2025-03-21 RX ORDER — ACETAMINOPHEN 325 MG/1
650 TABLET ORAL EVERY 6 HOURS PRN
Status: DISCONTINUED | OUTPATIENT
Start: 2025-03-21 | End: 2025-03-21 | Stop reason: HOSPADM

## 2025-03-21 RX ORDER — CEFAZOLIN SODIUM 2 G/50ML
2000 SOLUTION INTRAVENOUS ONCE
Status: COMPLETED | OUTPATIENT
Start: 2025-03-21 | End: 2025-03-21

## 2025-03-21 RX ORDER — EPHEDRINE SULFATE 50 MG/ML
INJECTION INTRAVENOUS AS NEEDED
Status: DISCONTINUED | OUTPATIENT
Start: 2025-03-21 | End: 2025-03-21

## 2025-03-21 RX ORDER — TRAMADOL HYDROCHLORIDE 50 MG/1
50 TABLET ORAL EVERY 6 HOURS SCHEDULED
Status: DISCONTINUED | OUTPATIENT
Start: 2025-03-21 | End: 2025-03-21 | Stop reason: HOSPADM

## 2025-03-21 RX ORDER — CHLORHEXIDINE GLUCONATE ORAL RINSE 1.2 MG/ML
15 SOLUTION DENTAL ONCE
Status: COMPLETED | OUTPATIENT
Start: 2025-03-21 | End: 2025-03-21

## 2025-03-21 RX ORDER — PROPOFOL 10 MG/ML
INJECTION, EMULSION INTRAVENOUS AS NEEDED
Status: DISCONTINUED | OUTPATIENT
Start: 2025-03-21 | End: 2025-03-21

## 2025-03-21 RX ORDER — OXYCODONE HYDROCHLORIDE 5 MG/1
5 TABLET ORAL EVERY 4 HOURS PRN
Qty: 15 TABLET | Refills: 0 | Status: SHIPPED | OUTPATIENT
Start: 2025-03-21

## 2025-03-21 RX ORDER — MIDAZOLAM HYDROCHLORIDE 2 MG/2ML
INJECTION, SOLUTION INTRAMUSCULAR; INTRAVENOUS AS NEEDED
Status: DISCONTINUED | OUTPATIENT
Start: 2025-03-21 | End: 2025-03-21

## 2025-03-21 RX ORDER — ONDANSETRON 2 MG/ML
INJECTION INTRAMUSCULAR; INTRAVENOUS AS NEEDED
Status: DISCONTINUED | OUTPATIENT
Start: 2025-03-21 | End: 2025-03-21

## 2025-03-21 RX ORDER — ASPIRIN 81 MG/1
81 TABLET ORAL 2 TIMES DAILY
Qty: 42 TABLET | Refills: 0 | Status: SHIPPED | OUTPATIENT
Start: 2025-03-21 | End: 2025-04-11

## 2025-03-21 RX ORDER — METOCLOPRAMIDE HYDROCHLORIDE 5 MG/ML
10 INJECTION INTRAMUSCULAR; INTRAVENOUS ONCE AS NEEDED
Status: DISCONTINUED | OUTPATIENT
Start: 2025-03-21 | End: 2025-03-21 | Stop reason: HOSPADM

## 2025-03-21 RX ORDER — FENTANYL CITRATE/PF 50 MCG/ML
50 SYRINGE (ML) INJECTION
Status: DISCONTINUED | OUTPATIENT
Start: 2025-03-21 | End: 2025-03-21 | Stop reason: HOSPADM

## 2025-03-21 RX ORDER — SODIUM CHLORIDE, SODIUM LACTATE, POTASSIUM CHLORIDE, CALCIUM CHLORIDE 600; 310; 30; 20 MG/100ML; MG/100ML; MG/100ML; MG/100ML
125 INJECTION, SOLUTION INTRAVENOUS CONTINUOUS
Status: DISCONTINUED | OUTPATIENT
Start: 2025-03-21 | End: 2025-03-21 | Stop reason: HOSPADM

## 2025-03-21 RX ADMIN — CHLORHEXIDINE GLUCONATE 15 ML: 1.2 SOLUTION ORAL at 08:23

## 2025-03-21 RX ADMIN — FENTANYL CITRATE 25 MCG: 50 INJECTION INTRAMUSCULAR; INTRAVENOUS at 10:09

## 2025-03-21 RX ADMIN — SODIUM CHLORIDE 8 MCG: 9 INJECTION, SOLUTION INTRAVENOUS at 09:38

## 2025-03-21 RX ADMIN — ACETAMINOPHEN 650 MG: 325 TABLET ORAL at 11:30

## 2025-03-21 RX ADMIN — ONDANSETRON 4 MG: 2 INJECTION INTRAMUSCULAR; INTRAVENOUS at 10:15

## 2025-03-21 RX ADMIN — FENTANYL CITRATE 25 MCG: 50 INJECTION INTRAMUSCULAR; INTRAVENOUS at 09:42

## 2025-03-21 RX ADMIN — DEXAMETHASONE SODIUM PHOSPHATE 10 MG: 10 INJECTION INTRAMUSCULAR; INTRAVENOUS at 09:45

## 2025-03-21 RX ADMIN — FENTANYL CITRATE 25 MCG: 50 INJECTION INTRAMUSCULAR; INTRAVENOUS at 09:49

## 2025-03-21 RX ADMIN — CEFAZOLIN SODIUM 2000 MG: 2 SOLUTION INTRAVENOUS at 09:45

## 2025-03-21 RX ADMIN — FENTANYL CITRATE 25 MCG: 50 INJECTION INTRAMUSCULAR; INTRAVENOUS at 10:11

## 2025-03-21 RX ADMIN — MIDAZOLAM 2 MG: 1 INJECTION INTRAMUSCULAR; INTRAVENOUS at 09:32

## 2025-03-21 RX ADMIN — SODIUM CHLORIDE, SODIUM LACTATE, POTASSIUM CHLORIDE, AND CALCIUM CHLORIDE 125 ML/HR: .6; .31; .03; .02 INJECTION, SOLUTION INTRAVENOUS at 08:23

## 2025-03-21 RX ADMIN — PROPOFOL 200 MG: 10 INJECTION, EMULSION INTRAVENOUS at 09:38

## 2025-03-21 RX ADMIN — SODIUM CHLORIDE, SODIUM LACTATE, POTASSIUM CHLORIDE, AND CALCIUM CHLORIDE: .6; .31; .03; .02 INJECTION, SOLUTION INTRAVENOUS at 10:17

## 2025-03-21 RX ADMIN — EPHEDRINE SULFATE 10 MG: 50 INJECTION, SOLUTION INTRAVENOUS at 10:02

## 2025-03-21 NOTE — OP NOTE
OPERATIVE REPORT  PATIENT NAME: Lovely Trevino    :  1995  MRN: [id number]  Pt Location: [facility] OR ROOM 05    SURGERY DATE: 3/21/2025    Surgeons and Role:     * Papa Kaminski DO - Primary     * Rachana Castle PA-C - Assisting    Preop Diagnosis:  Cyclops lesion of left knee [M25.862]    Post-Op Diagnosis Codes:     * Cyclops lesion of left knee [M25.862]    Procedure(s):  Left - ARTHROSCOPY KNEE  Left - MENISCECTOMY LATERAL /MEDIAL    Specimen(s):  ID Type Source Tests Collected by Time Destination   1 : Cyclops lesion left knee Tissue Lesion TISSUE EXAM Papa Kaminski DO 3/21/2025 1012        Estimated Blood Loss:   Minimal    Drains:  * No LDAs found *    Anesthesia Type:   General    Operative Indications:  Cyclops lesion of left knee [M25.862]      Complications:   None    Procedure and Technique:     This is a 29 year old female with a lack of full terminal extension after prior acl reconstruction. Due to the patient's MRI findings, active lifestyle, and lack of improvement with a conservative approach, it was recommended that they proceed forward with arthroscopic surgical management of this problem. We reviewed risks and benefits of surgery and a decision was made to proceed with surgery       Findings:       Examination under anesthesia of the operative left knee revealed a range of motion of 0-130 degrees. Posterior drawer testing was negative. Lachman testing was negative. Pivot shift testing was negative,  Collateral ligament stability testing revealed no laxity with valgus or varus stresses. With respect to posterolateral corner testing, dial testing at 30 and at 90 degrees was symmetric to the contralateral knee.     Arthroscopic evaluation of the knee revealed the following:     Medial meniscus: no tears  Medial femoral condyle:Grade 0 chondral defects.  Medial tibial plateau: Grade  0 chondral defects.   Anterior cruciate ligament: Normal appearance of reconstructed  acl  Posterior cruciate ligament: Normal appearance.   Lateral meniscus: No tears.  Lateral femoral condyle: Grade 0 chondral defects.   Lateral tibial plateau: Grade0 chondral defects.    Medial and lateral gutters: No loose bodies.   Patella: Grade 0 chondral defects.   Trochlea: Grade 0 chondral defects.   Medial plica: No significant plica was present..          Procedure:  In the pre-operative holding area, the patient identified the correct operative extremity and I marked that extremity with my initials, using a permanent marker. The patient was brought to the operating room and positioned supine. Following satisfactory induction of anesthesia, the left knee was prepped and draped in the usual sterile fashion for surgical arthroscopy of the left knee. Before any surgical instrumentation was passed to me by the surgical technician, a formalized time-out occurred, which involves the surgeon, circulating nurse, and anesthesia staff all verifying the correct operative extremity. My initials were visible on the prepped and draped operative field.      The anatomic landmarks of the anteromedial and anterolateral portals were marked. The anterolateral portal was established with a scalpel. The arthroscope was introduced through this portal. Under direct visualization, the anteromedial portal was established with a localizing needle followed by a scalpel. A probe was then introduced into the anteromedial portal. A systematic diagnostic arthroscopy evaluated the following:  medial compartment, notch, lateral compartment, patellofemoral compartment, medial gutter, and lateral gutter.      No meniscus tears were seen     There was a small amount of scar tissue anterior to the ACL, in the retropatellar fat pad in the form of a cyclops lesion.  This was debrided with a motorized shaver and biting devices until no scar tissue was seen.  This completed the synovectomy.  There was no impinging of the graft or crepitation  felt with ROM of her knee intraop after this and care was taken to avoid any acl fibers.     There was no additional pathology. All particulate debris was removed. The knee was copiously rinsed and then drained. The portals were closed with an interrupted 4-0 monocryl absorbable suture. The skin was cleansed with sterile saline and dried before Steri-Strips were applied. Finally, a sterile dressing was secured by Webril and an Ace wrap.      I was present for the entire procedure., A qualified resident physician was not available., and A physician assistant was required during the procedure for retraction, tissue handling, dissection and suturing.    Patient Disposition:  PACU          SIGNATURE: Papa Kaminski DO  DATE: March 21, 2025  TIME: 10:53 AM

## 2025-03-21 NOTE — ANESTHESIA PREPROCEDURE EVALUATION
Procedure:  ARTHROSCOPY KNEE (Left: Knee)  MENISCECTOMY LATERAL /MEDIAL (Left: Knee)    Relevant Problems   No relevant active problems        Physical Exam    Airway    Mallampati score: II  TM Distance: >3 FB  Neck ROM: full     Dental   No notable dental hx     Cardiovascular      Pulmonary      Other Findings  post-pubertal.      Anesthesia Plan  ASA Score- 2     Anesthesia Type- general with ASA Monitors.         Additional Monitors:     Airway Plan: LMA.           Plan Factors-Exercise tolerance (METS): >4 METS.    Chart reviewed.   Existing labs reviewed. Patient summary reviewed.    Patient is not a current smoker.      There is medical exclusion for perioperative obstructive sleep apnea risk education.        Induction- intravenous.    Postoperative Plan- Plan for postoperative opioid use.         Informed Consent- Anesthetic plan and risks discussed with patient.  I personally reviewed this patient with the CRNA. Discussed and agreed on the Anesthesia Plan with the CRNA..      NPO Status:  Vitals Value Taken Time   Date of last liquid 03/21/25 03/21/25 0816   Time of last liquid 0010 03/21/25 0816   Date of last solid 03/20/25 03/21/25 0816   Time of last solid 2130 03/21/25 0816

## 2025-03-21 NOTE — H&P
Cyclops    Excision with possible meniscetomy    Ortho Sports Medicine Knee Follow Up Visit     Assesment:     29 y.o. female 4 months postop from arthroscopic repair of left ACL with quadriceps autograft who continues inability to fully extend her left knee with cyclops lesion appreciated on MRI causing lack of extension    Plan:    Post-Operative treatment:    PT for ROM/strengthening to knee, hip and core.  OTC NSAIDS prn for pain.  Tylenol for pain.    Discussed risks and benefits to surgical arthroscopy with synovectomy and removal of cyclops lesion.  All of her questions were answered.  She would like to proceed forward with the surgery noted above.  We also discussed possible meniscectomy versus repair if she happens to have a tear but this is doubtful given her MRI shows no evidence of meniscus tears      No chief complaint on file.      History of Present Illness:    The patient is returns for follow up of arthroscopic reconstruction of her left ACL with quadriceps allograft and meniscectomy (DOS: 08/01/2024).  Since the prior visit, she reports she has continued her physical therapy using a virtual application called [employer]  She states that she has seen significant improvement in her strength, however continues to feel that there is a mechanical block preventing her from fully extending at her left knee.      Pain is located anterior, medial.     Pain is improved by rest and NSAIDS.  Pain is aggravated by extending her knee.  Symptoms include sensation of mechanical block.     The patient has tried rest, ice, NSAIDS, and physical therapy.    She obtained her MRI.  She does still note a lack of full extension.  No other injuries or new complaints at this time.      Knee Surgical History:  8/1/24     Past Medical, Social and Family History:  Past Medical History:   Diagnosis Date    Anesthesia complication     pt has hx of waking up during procedures, if this happens she wakes up combative/violent    Stomach  ulcer     Vaginal yeast infection      Past Surgical History:   Procedure Laterality Date    COLONOSCOPY  12/2016    Dr Phoenix. Diarrhea and melena.  Scope advanced to hepatic flexure, unable to advance ascending colon due to looping.  Transverse colon, descending colon and sigmoid colon and rectum were significant for mild patchy colitis and proctitis, no pathology.    EGD  12/2016    Healing esophageal ulcer, esophagitis, biopsy showed hyperplastic squamous mucosa showing nonspecific patchy and mild chronic inflammation features consistent with GERD.    [place] ARTHRS AIDED ANT CRUCIATE LIGM RPR/AGMNTJ/RCNSTJ Left 8/1/2024    Procedure: ARTHROSCOPIC RECONSTRUCTION ANTERIOR CRUCIATE LIGAMENT (ACL) WITH AUTOGRAFT- quadiceps autografts;  Surgeon: Papa Kaminski DO;  [facility];  Service: Orthopedics    [place] ARTHRS KNE SURG W/MENISCECTOMY MED/LAT W/SHVG Left 8/1/2024    Procedure: MENISCECTOMY LATERAL /MEDIAL;  Surgeon: Papa Kaminski DO;  Location: [facility];  Service: Orthopedics    THUMB ARTHROSCOPY      UPPER GASTROINTESTINAL ENDOSCOPY      WISDOM TOOTH EXTRACTION N/A      No Known Allergies  No current facility-administered medications on file prior to encounter.     Current Outpatient Medications on File Prior to Encounter   Medication Sig Dispense Refill    acetaminophen (TYLENOL) 500 mg tablet Take 500 mg by mouth every 6 (six) hours as needed for mild pain      Multiple Vitamin (MULTIVITAMIN PO) Take by mouth      aspirin 500 MG buffered tablet Take 500 mg by mouth every 6 (six) hours as needed for mild pain For pain from ER      aspirin 81 mg chewable tablet Chew 1 tablet (81 mg total) 2 (two) times a day 84 tablet 0    Bioflavonoid Products (BIOFLEX PO) Take by mouth      FAMOTIDINE PO Take by mouth if needed      multivitamin (THERAGRAN) TABS Take 1 tablet by mouth daily      norgestimate-ethinyl estradiol (ORTHO TRI-CYCLEN LO) 0.18/0.215/0.25 MG-25 MCG per tablet Take 1 tablet by mouth daily 28  tablet 2    nystatin (MYCOSTATIN) powder APPLY TO RED AREA BETWEEN BREASTS 3x PER DAY UNTIL HEALING IS COMPLETE 15 g 0    Omeprazole Magnesium (PRILOSEC PO) Take by mouth if needed      oxyCODONE (Roxicodone) 5 immediate release tablet Take 1 tablet (5 mg total) by mouth every 4 (four) hours as needed for moderate pain Max Daily Amount: 30 mg (Patient not taking: Reported on 9/25/2024) 10 tablet 0     Social History     Socioeconomic History    Marital status: Single     Spouse name: Not on file    Number of children: Not on file    Years of education: Not on file    Highest education level: Not on file   Occupational History    Occupation: practice manager   Tobacco Use    Smoking status: Never    Smokeless tobacco: Never   Vaping Use    Vaping status: Never Used   Substance and Sexual Activity    Alcohol use: Not Currently     Alcohol/week: 1.0 - 2.0 standard drink of alcohol     Types: 1 - 2 Standard drinks or equivalent per week    Drug use: Yes     Comment: CBD roll on- over a month    Sexual activity: Yes     Partners: Male     Birth control/protection: Condom     Comment: LMP 7/25/24   Other Topics Concern    Not on file   Social History Narrative    Not on file     Social Drivers of Health     Financial Resource Strain: Not on file   Food Insecurity: Not on file   Transportation Needs: Not on file   Physical Activity: Not on file   Stress: Not on file   Social Connections: Unknown (6/18/2024)    Received from [facility]     How often do you feel lonely or isolated from those around you? (Adult - for ages 18 years and over): Not on file   Intimate Partner Violence: Not on file   Housing Stability: Not on file         I have reviewed the past medical, surgical, social and family history, medications and allergies as documented in the EMR.    Review of systems: ROS is negative other than that noted in the HPI.  Constitutional: Negative for fatigue and fever.        Physical Exam:    Blood  "pressure 118/77, pulse 81, height 5' 5\" (1.651 m), weight 98.9 kg (218 lb), not currently breastfeeding.    General/Constitutional: NAD, well developed, well nourished  HENT: Normocephalic, atraumatic  CV: Intact distal pulses, regular rate  Resp: No respiratory distress or labored breathing  Lymphatic: No lymphadenopathy palpated  Neuro: Alert and Oriented x 3, no focal deficits  Psych: Normal mood, normal affect, normal judgement, normal behavior  Skin: Warm, dry, no rashes, no erythema      Knee Exam (focused):               RIGHT LEFT   ROM:   0-130    Palpation: Effusion negative negative     MJL tenderness Negative Positive     LJL tenderness Negative Negative   Meniscus: Lexi Negative Negative    Apley's Compression Negative Negative   Instability: Varus stable stable     Valgus stable stable   Special Tests: Lachman Negative Negative     Posterior drawer Negative Negative     Anterior drawer Negative Negative     Pivot shift not tested not tested     Dial not tested not tested   Patella: Palpation no tenderness no tenderness     Mobility 1/4 1/4     Apprehension Negative Negative   Other: Single leg 1/4 squat not tested not tested        Incisions show no erythema, no drainage    LE NV Exam: +2 DP/PT pulses bilaterally  Sensation intact to light touch L2-S1 bilaterally     Bilateral hip ROM demonstrates no pain actively or passively    No calf tenderness to palpation bilaterally      MRI of the left knee demonstrates intact ACL reconstruction with no meniscus tears or ACL retear.  There is however a large amount of arthrofibrosis and scar tissue in the anterior anterior intercondylar notch indicating a cyclops lesion.  I have reviewed the radiology report and agree with the impression      Scribe Attestation      I,:   am acting as a scribe while in the presence of the attending physician.:       I,:   personally performed the services described in this documentation    as scribed in my presence.:  "

## 2025-03-21 NOTE — DISCHARGE INSTR - AVS FIRST PAGE
POSTOPERATIVE INSTRUCTIONS following KNEE SURGERY    MEDICATIONS:  Resume all home medications unless otherwise instructed by your surgeon.  Pain Medication:  Oxycodone 5 mg, 1 tablet every 4 hours as needed  If you were given a regional anesthetic (nerve block), please begin taking the pain medication as soon as you get home, even if you have minimal or no pain.  DO NOT WAIT FOR THE NERVE BLOCK TO WEAR OFF.  Possible side effects include nausea, constipation, and urinary retention.  If you experience these side effects, please call our office for assistance.  Pain med refills are authorized only during office hours (8am-4pm, Mon-Fri).  Anti-Inflammatory:  Tylenol 325 mg, 1-2 tablets every 6 hours and Ibuprofen 600 mg, 1 tablet every 8 hours  Take with food.  Stop if you experience nausea, reflux, or stomach pain.  Nausea Medication:  None  Fill prescription ONLY if you expericnce severe nausea.  Blood Clot Prevention:  Aspirin 81 mg, 1 tablet twice daily for 3 weeks  Pump your foot up and down 20 times per hour while you are less mobile.    WOUND CARE:  Keep the dressing clean and dry.  Light drainage may occur the first 2 days postop.  You may remove the dressings and get the incision wet in the shower 72 hours after surgery.  DO NOT remove steri-strips or sutures.  DO NOT immerse the incision under water.  Carefully pat the incision dry.  If there is wound drainage, re-apply a fresh dry gauze dressing.  Please call our office ([phone]) if you experience either of the following:  Sudden increase in swelling, redness, or warmth at the surgical site  Excessive incisional drainage that persists beyond the 3rd day after surgery  Oral temperature greater than 101 degrees, not relieved with Tylenol  Shortness of breath, chest pain, nausea, or any other concerning symptoms    SWELLING CONTROL:  Cold Therapy:  The cold therapy device may be used either continuously or only as needed, according to your preference.  Do  "not let the pad directly touch your skin.  Alternatively, apply ice (20 min on, 20 min off) as often as you feel is necessary.  Elevation:  Elevate the entire leg above heart level.  Place pillows under your ankle to keep your knee straight.  Compression:  Apply ACE wraps or a thigh-length compression stocking as needed.    RANGE OF MOTION:  You are allowed FULL RANGE OF MOTION as tolerated.    IMMOBILIZATION:  None.  You are allowed full range of motion as tolerated.    ACTIVITY:   BEAR FULL WEIGHT AS TOLERATED on the operative leg.  Use crutches to assist only as needed.  Using Crutches on Stairs:  Going up, lead with your \"good\" (nonoperative) leg.  Going down, lead with your \"bad\" (operative) leg.  Use a hand rail when available.  Knee Extension:  Place a rolled towel or pillow under your ankle for 20-30 minutes 3-5 times per day.  This will help to maintain full knee extension.  Quad Sets:  Sit or lie with your knee straight.  Tighten your quadriceps (front thigh) muscle.  Hold for 3 seconds, then relax.  Repeat 20 times per hour while awake.    PHYSICAL THERAPY:  Begin therapy 5 TO 7 DAYS AFTER SURGERY.  You were given a prescription for therapy at your preoperative office visit.  If you do not have physical therapy scheduled yet, please call our office for assistance.    FOLLOW-UP APPOINTMENT:  7-10 days after surgery with:    HOWARD [email or web address].  [facility]  [address] [place] [address], [address], [place] [address]  [phone]   "

## 2025-03-21 NOTE — ANESTHESIA POSTPROCEDURE EVALUATION
Post-Op Assessment Note    CV Status:  Stable    Pain management: adequate       Mental Status:  Alert and awake   Hydration Status:  Euvolemic   PONV Controlled:  Controlled   Airway Patency:  Patent     Post Op Vitals Reviewed: Yes    No anethesia notable event occurred.    Staff: with CRNAs, Anesthesiologist           Last Filed PACU Vitals:  Vitals Value Taken Time   Temp 97.3 °F (36.3 °C) 03/21/25 1115   Pulse 90 03/21/25 1116   /55 03/21/25 1115   Resp 19 03/21/25 1116   SpO2 94 % 03/21/25 1116   Vitals shown include unfiled device data.    Modified Mark:     Vitals Value Taken Time   Activity 2 03/21/25 1115   Respiration 2 03/21/25 1115   Circulation 2 03/21/25 1115   Consciousness 1 03/21/25 1115   Oxygen Saturation 2 03/21/25 1115     Modified Mark Score: 9

## 2025-03-21 NOTE — ANESTHESIA POSTPROCEDURE EVALUATION
Post-Op Assessment Note    CV Status:  Stable  Pain Score: 0    Pain management: adequate       Mental Status:  Alert and awake   Hydration Status:  Euvolemic   PONV Controlled:  Controlled   Airway Patency:  Patent     Post Op Vitals Reviewed: Yes    No anethesia notable event occurred.    Staff: CRNA           Last Filed PACU Vitals:  Vitals Value Taken Time   Temp     Pulse 93 03/21/25 1039   BP 84/47 03/21/25 1033   Resp 21 03/21/25 1039   SpO2 97 % 03/21/25 1039   Vitals shown include unfiled device data.

## 2025-03-26 ENCOUNTER — OFFICE VISIT (OUTPATIENT)
Dept: OBGYN CLINIC | Facility: MEDICAL CENTER | Age: 30
End: 2025-03-26

## 2025-03-26 ENCOUNTER — TELEPHONE (OUTPATIENT)
Dept: OBGYN CLINIC | Facility: HOSPITAL | Age: 30
End: 2025-03-26

## 2025-03-26 VITALS — WEIGHT: 237 LBS | BODY MASS INDEX: 38.09 KG/M2 | HEIGHT: 66 IN

## 2025-03-26 DIAGNOSIS — M25.862 CYCLOPS LESION OF LEFT KNEE: Primary | ICD-10-CM

## 2025-03-26 PROCEDURE — 88304 TISSUE EXAM BY PATHOLOGIST: CPT | Performed by: PATHOLOGY

## 2025-03-26 PROCEDURE — 99024 POSTOP FOLLOW-UP VISIT: CPT | Performed by: ORTHOPAEDIC SURGERY

## 2025-03-26 RX ORDER — OMEGA-3 FATTY ACIDS/FISH OIL 300-1000MG
CAPSULE ORAL
COMMUNITY
Start: 2025-03-21

## 2025-03-26 NOTE — PROGRESS NOTES
"Knee Post Operative Visit     Assesment:     Surgical Arthroscopy of the left knee with cyclops excision    Plan:    Post-Operative treatment:    Ice to knee for 20 minutes at least 1-2 times daily.  PT for ROM/strengthening to knee, hip and core.    Imaging:    All imaging from today was reviewed by myself and explained to the patient.     Weight bearing:  as tolerated     ROM:  Full    Brace:  No brace needed    DVT Prophylaxis:  Ambulation    Follow up:   6 weeks     Patient was advised that if they have any fevers, chills, chest pain, shortness of breath, redness or drainage from the incision, please let our office know immediately.        History of Present Illness:    The patient is a 29 y.o. female who is being evaluated post operatively 1 week  status post cyclops excision left knee.    Since the prior visit, She reports minimal improvement.     Pain is well controlled.  The patient is using ice to control swelling.    They have started physical therapy.    The patient has been ambulating without crutches.    The patient has been ambulating without a brace.    The patient denies any fevers, chills, calf pain, chest pain/shortness of breath, redness or drainage from the incision.         I have reviewed the past medical, surgical, social and family history, medications and allergies as documented in the EMR.    Review of systems: ROS is negative other than that noted in the HPI.  Constitutional: Negative for fatigue and fever.        Physical Exam:    Height 5' 6\" (1.676 m), weight 108 kg (237 lb), not currently breastfeeding.    General/Constitutional: NAD, well developed, well nourished  HENT: Normocephalic, atraumatic  CV: Intact distal pulses, regular rate  Resp: No respiratory distress or labored breathing  Lymphatic: No lymphadenopathy palpated  Neuro: Alert and Oriented x 3, no focal deficits  Psych: Normal mood, normal affect, normal judgement, normal behavior  Skin: Warm, dry, no rashes, no " erythema       Knee Exam (focused):                  RIGHT LEFT   ROM:   0-130 0-130   Palpation: Effusion negative negative     MJL tenderness Negative Negative     LJL tenderness Negative Negative   Instability: Varus stable stable     Valgus stable stable   Special Tests: Lachman Negative Negative     Posterior drawer Negative Negative     Anterior drawer Negative Negative     Pivot shift not tested not tested     Dial not tested not tested   Patella: Palpation no tenderness no tenderness     Mobility 1/4 1/4     Apprehension Negative Negative   Other: Single leg 1/4 squat not tested not tested      Incisions show no erythema, no drainage    LE NV Exam: +2 DP/PT pulses bilaterally  Sensation intact to light touch L2-S1 bilaterally     Bilateral hip ROM demonstrates no pain actively or passively    No calf tenderness to palpation bilaterally

## 2025-03-26 NOTE — TELEPHONE ENCOUNTER
Caller: Patient    Doctor: Dr. Kaminski    Reason for call: Patient is running late due to traffic for her PO visit today with Rachana and won't be there until at least 12. I called the office and spoke w/Chiara and she confirmed that Dr. Kaminski will see her and to check in when she gets there. They will move appt.    Call back#: [phone]

## 2025-04-09 ENCOUNTER — EVALUATION (OUTPATIENT)
Dept: PHYSICAL THERAPY | Age: 30
End: 2025-04-09
Payer: COMMERCIAL

## 2025-04-09 DIAGNOSIS — T84.82XA CYCLOPS LESION OF LEFT KNEE: ICD-10-CM

## 2025-04-09 DIAGNOSIS — Z98.890 S/P LEFT KNEE ARTHROSCOPY: ICD-10-CM

## 2025-04-09 DIAGNOSIS — M24.662 CYCLOPS LESION OF LEFT KNEE: ICD-10-CM

## 2025-04-09 PROCEDURE — 97161 PT EVAL LOW COMPLEX 20 MIN: CPT | Performed by: PHYSICAL THERAPIST

## 2025-04-09 PROCEDURE — 97110 THERAPEUTIC EXERCISES: CPT | Performed by: PHYSICAL THERAPIST

## 2025-04-09 NOTE — HOME EXERCISE EDUCATION
Program_ID:[id number]   Access Code: [email or web address]  URL: [email or web address]  Date: 04-  Prepared By: Nunu Lopez    Program Notes      Exercises      - Supine Quadricep Sets - 1 x daily - 7 x weekly - 3 sets - 10 reps      - Active Straight Leg Raise with Quad Set - 1 x daily - 7 x weekly - 3 sets - 10 reps      - Sidelying Hip Abduction - 1 x daily - 7 x weekly - 3 sets - 10 reps      - Standing Terminal Knee Extension at Wall with Ball - 1 x daily - 7 x weekly - 3 sets - 10 reps      - Mini Squat with Counter Support - 1 x daily - 7 x weekly - 3 sets - 10 reps      
impaired balance/decreased flexibility/decreased strength

## 2025-04-09 NOTE — PROGRESS NOTES
PT Evaluation     Today's date: 2025  Patient name: Lovely Trevino  : 1995  MRN: [id number]  Referring provider: Rachana Castle,*  Dx:   Encounter Diagnosis     ICD-10-CM    1. Cyclops lesion of left knee  M25.862 Ambulatory Referral to Physical Therapy      2. S/P left knee arthroscopy  Z98.890 Ambulatory Referral to Physical Therapy          Start Time: 1430  Stop Time: 1530  Total time in clinic (min): 60 minutes    Assessment  Impairments: abnormal or restricted ROM, activity intolerance, impaired balance, impaired physical strength, lacks appropriate home exercise program, pain with function, participation limitations and activity limitations  Functional limitations: walking, sqautting, kneeling, stairs  Symptom irritability: moderate    Assessment details: Lovely Trevino is a pleasant 29-year-old female who presents status post left knee arthroscopy for cyclops removal.  She also had a recent ACL reconstruction with quad tendon autograft.  She has residual range of motion deficits including lacking terminal knee extension compared to her right knee however her right knee does go into hyperextension.  She has decreased quad strength as well as quad fatigue and produces a lag when fatigued onsets.  She displays a mildly asymmetrical gait pattern with minimal antalgia.  At this time Lovely would benefit from skilled physical therapy services to address the residual impairments to improve overall quad control including dynamic strength and endurance in order to normalize gait and restore function.  We will begin a trial of aquatic therapy to work on agility exercises and gravity reduced environment as patient never did get to this status after her ACL repair.  We will also begin a trial of land-based therapy for progressive strengthening.    Barriers to therapy: High out of pocket co-pay and co-insurance    Plan  Patient would benefit from: skilled physical therapy  Planned  modality interventions: neuromuscular electric stimulation    Planned therapy interventions: aquatic therapy, neuromuscular re-education, balance/weight bearing training, strengthening, functional ROM exercises, graded exercise, home exercise program, therapeutic exercise and therapeutic activities    Frequency: 1-2x per week.  Plan of Care beginning date: 2025  Plan of Care expiration date: 2025  Treatment plan discussed with: patient  Plan details: Patient will be seen for 1-2 sessions of land based therapy and 1-2 sessions of aquatic based therapy.  Due to high co-pay and coinsurance if she can perform exercises with good quality and safely she will transition to an independent fitness program.        Subjective Evaluation    History of Present Illness  Date of surgery: 3/21/2025  Mechanism of injury: surgery  Mechanism of injury: Pt tore her ACL while jumping at a trampoline park and underwent reconstruction with autograft using quad tendon 24. She developed a cyclops lesion and could not fully extend her knee. She underwent arthroscope for removal of the scar tissue 3/21/25.   She had prior therapy in office for her ACL repair then deferred to home program with Trice.     Pt has residual pain, difficulty with stairs, squatting, kneeling, pain with sleeping.     Pt's goals are to get stronger, lose weight, be able to walk better with full mobility.      Patient Goals  Patient goals for therapy: decreased pain and increased motion  Patient goal: improve gait  Pain  Current pain ratin  At worst pain ratin  Location: left knee          Objective     Observations   Left Knee   Positive for incision.     Additional Observation Details  2 Scope portals healing nicely, medial incision provokes pain to palpation with small scar formation    Neurological Testing     Additional Neurological Details  No neuro signs    Active Range of Motion   Left Knee   Flexion: 130 degrees   Extension: -2 degrees  "    Right Knee   Hyperextension   Flexion: 132 degrees   Extension: 2 (hyperextension) degrees     Strength/Myotome Testing     Left Knee   Flexion: 4  Extension: 4-  Quadriceps contraction: fair    Right Knee   Flexion: 5  Extension: 5  Quadriceps contraction: good    Additional Strength Details  Left Quad fatigues easily with observable atrophy    Ambulation     Observational Gait   Gait: asymmetric     General Comments:      Knee Comments  SLS firm EO right 30, left 20   SLS foam EO right 20,  left 5             POC expires Unit limit Auth Expiration date PT/OT + Visit Limit?   7/8/25 BOMN 9/29/25 12 approved (45)                           Visit/Unit Tracking  AUTH Status:  Date 4/9              Auth needed Used 1               Remaining  11               FOTO                     Precautions: standard    Access Code: [email or web address]  URL: [email or web address]  Date: 04-  Prepared By: Nunu Lopez     Program Notes        Exercises      - Supine Quadricep Sets - 1 x daily - 7 x weekly - 3 sets - 10 reps      - Active Straight Leg Raise with Quad Set - 1 x daily - 7 x weekly - 3 sets - 10 reps      - Sidelying Hip Abduction - 1 x daily - 7 x weekly - 3 sets - 10 reps      - Standing Terminal Knee Extension at Wall with Ball - 1 x daily - 7 x weekly - 3 sets - 10 reps      - Mini Squat with Counter Support - 1 x daily - 7 x weekly - 3 sets - 10 reps    TherEx 4/9            P ed/HEP             TKE quad set on foam 20x 3\"            SLR 3E 2x10            TKE ball on wall 10x            SL ABD  10x                                                                                                                    Ther Ex                                                                                                                     Ther Activity                                       Gait Training                                       Modalities                                            "

## 2025-04-16 ENCOUNTER — OFFICE VISIT (OUTPATIENT)
Dept: PHYSICAL THERAPY | Age: 30
End: 2025-04-16
Payer: COMMERCIAL

## 2025-04-16 DIAGNOSIS — M24.662 CYCLOPS LESION OF LEFT KNEE: Primary | ICD-10-CM

## 2025-04-16 DIAGNOSIS — T84.82XA CYCLOPS LESION OF LEFT KNEE: Primary | ICD-10-CM

## 2025-04-16 DIAGNOSIS — Z98.890 S/P LEFT KNEE ARTHROSCOPY: ICD-10-CM

## 2025-04-16 PROCEDURE — 97113 AQUATIC THERAPY/EXERCISES: CPT

## 2025-04-16 NOTE — PROGRESS NOTES
"Daily Note     Today's date: 2025  Patient name: Lovely Trevino  : 1995  MRN: [id number]  Referring provider: Rachana Castle,*  Dx:   Encounter Diagnosis     ICD-10-CM    1. Cyclops lesion of left knee  M25.862       2. S/P left knee arthroscopy  Z98.890           Start Time: 1000  Stop Time: 1100  Total time in clinic (min): 60 minutes    Subjective: pt c/o sig weakness in L LE. She c/o \"crunchy\" feeling in her L knee w/ flexion.      Objective: See treatment diary below      Assessment: Tolerated treatment fairly well with initial pool session. Pt has poor endurance for exercises, fatigued w/ ex's. Started w/ water walking forward/backwards for LE control against the water, progressed to monster walks and side stepping in shallow water. Leg strengthening ex's at the wall included standing marches w/ opp UE reach to work on balance as well. Pt struggled initially w/ her balance during this ex but improved w/ cueing to activate core and glutes.  Continued w/ standing LE ex's for strengthening against the weight of the water and progressed pt to more agility ex's.started w/ hopscotch across the pool then fast feet across pool and ended agility ex's w/ single leg  hop to other foot. Worked on TKE w/ seated L LE kick outs w/ a hold to contract quad f/b deep water biking, hip abd/add/ and hip flex/ext ex's to improve stamina and endurance for ex's. No c/o pain throughout session but pt c/o fatigue and crepitus in L knee w/ ex's. Educated pt on being sore following PT due to the ex's and strengthening program. Pt has a high copay and will transition to our pool fitness program by PT after a visit of land PT. Pt was given a printout of her ex's performed today, fitness packet and fitness information. Patient would benefit from continued PT      Plan: Continue per plan of care.  Pt to transition to fitness after one more visit.       POC expires Unit limit Auth Expiration date PT/OT + Visit " Limit?   7/8/25 BOMN  $65 copay 9/29/25 12 approved (45)                           Visit/Unit Tracking  AUTH Status:  Date 4/9 4/16             Auth needed Used 1 2              Remaining  11 10              FOTO                     Precautions: standard    Access Code: [email or web address]  URL: [email or web address]  Date: 04-  Prepared By: Nunu Lopez     Program Notes        Exercises      - Supine Quadricep Sets - 1 x daily - 7 x weekly - 3 sets - 10 reps      - Active Straight Leg Raise with Quad Set - 1 x daily - 7 x weekly - 3 sets - 10 reps      - Sidelying Hip Abduction - 1 x daily - 7 x weekly - 3 sets - 10 reps      - Standing Terminal Knee Extension at Wall with Ball - 1 x daily - 7 x weekly - 3 sets - 10 reps      - Mini Squat with Counter Support - 1 x daily - 7 x weekly - 3 sets - 10 reps  Precautions:  Daily Treatment Diary     Date 4/16          Patient education 5'          Water Walk (FW, BW, monster, side to side) 5' f/b, 2' mon, 2' ss          LE work at wall               Hip Ext  2'            Toe/heel  1'            Hip ABD/ADD  2'             W/ opp UE reach 1'            Knee flexion & extension 1'            Squats 1'          SL squats x5ea          UE noodle x3             Push/pull              Rotate              Row forward & back              OH side bend            Agility ex's           Hopscotch 2 laps          Single leg jumps small to other foot 2 laps          Fast feet in/out 2 laps            Push pull              Single paddle rotation           SLS (EO, EC, ball toss)            Aqua Step (FW, lat, eccentric)  L for 1', lat 1'          Core work:              MF press (red, blue, blk)             Kickboard press (blue, red)              Row/ext w/ tubing (red, blue, blk)           Seated on bench             ankle DF/PF              March              ABD/ADD              Knee flexion/extension  L x20 5'' hold          DW TX - hang in the deep water               "DW ABD/ADD  2            DW Bicycle  6            DW Scissor hip flexion/extension  2            DW DKTC            Stretches              HS at step  30''ea x2            Wall stretches              Calf stretch at wall              Other: knee lunge on step x2ea          Hot Tub - 10 minutes only  10                 TherEx 4/9            P ed/HEP             TKE quad set on foam 20x 3\"            SLR 3E 2x10            TKE ball on wall 10x            SL ABD  10x                                                                                                                    Ther Ex                                                                                                                     Ther Activity                                       Gait Training                                       Modalities                                            "

## 2025-04-18 ENCOUNTER — APPOINTMENT (OUTPATIENT)
Dept: PHYSICAL THERAPY | Age: 30
End: 2025-04-18
Payer: COMMERCIAL

## 2025-04-21 ENCOUNTER — OFFICE VISIT (OUTPATIENT)
Dept: PHYSICAL THERAPY | Age: 30
End: 2025-04-21
Attending: PHYSICIAN ASSISTANT
Payer: COMMERCIAL

## 2025-04-21 DIAGNOSIS — M24.662 CYCLOPS LESION OF LEFT KNEE: Primary | ICD-10-CM

## 2025-04-21 DIAGNOSIS — T84.82XA CYCLOPS LESION OF LEFT KNEE: Primary | ICD-10-CM

## 2025-04-21 DIAGNOSIS — Z98.890 S/P LEFT KNEE ARTHROSCOPY: ICD-10-CM

## 2025-04-21 PROCEDURE — 97110 THERAPEUTIC EXERCISES: CPT | Performed by: PHYSICAL THERAPIST

## 2025-04-21 NOTE — PROGRESS NOTES
Daily Note     Today's date: 2025  Patient name: Lovely Trevino  : 1995  MRN: [id number]  Referring provider: Rachana Castle,*  Dx:   Encounter Diagnosis     ICD-10-CM    1. Cyclops lesion of left knee  M24.662     T84.82XA       2. S/P left knee arthroscopy  Z98.890           Start Time: 1535  Stop Time: 1615  Total time in clinic (min): 40 minutes    Subjective: Pt reports she felt good with the pool, no increased pain      Objective: See treatment diary below      Assessment: Pt tolerated session well. Discussed DOMS and expectation of muscle soreness post-session. Pt demonstrated good understanding.       Plan: Continue per plan of care.        POC expires Unit limit Auth Expiration date PT/OT + Visit Limit?   25 BOMN  $65 copay 25 12 approved (45)                           Visit/Unit Tracking  AUTH Status:  Date             Auth needed Used 1 2 3             Remaining  11 10 9             FOTO                     Precautions: standard    Access Code: [email or web address]  URL: [email or web address]  Date: 2025  Prepared By: Nunu Lopez     Program Notes        Exercises      - Supine Quadricep Sets - 1 x daily - 7 x weekly - 3 sets - 10 reps      - Active Straight Leg Raise with Quad Set - 1 x daily - 7 x weekly - 3 sets - 10 reps      - Sidelying Hip Abduction - 1 x daily - 7 x weekly - 3 sets - 10 reps      - Standing Terminal Knee Extension at Wall with Ball - 1 x daily - 7 x weekly - 3 sets - 10 reps      - Mini Squat with Counter Support - 1 x daily - 7 x weekly - 3 sets - 10 reps  Precautions:  Daily Treatment Diary     Date           Patient education 5'          Water Walk (FW, BW, monster, side to side) 5' f/b, 2' mon, 2' ss          LE work at wall               Hip Ext  2'            Toe/heel  1'            Hip ABD/ADD  2'             W/ opp UE reach 1'            Knee flexion & extension 1'            Squats 1'          SL squats x5ea      "     UE noodle x3             Push/pull              Rotate              Row forward & back              OH side bend            Agility ex's           Hopscotch 2 laps          Single leg jumps small to other foot 2 laps          Fast feet in/out 2 laps            Push pull              Single paddle rotation           SLS (EO, EC, ball toss)            Aqua Step (FW, lat, eccentric)  L for 1', lat 1'          Core work:              MF press (red, blue, blk)             Kickboard press (blue, red)              Row/ext w/ tubing (red, blue, blk)           Seated on bench             ankle DF/PF              March              ABD/ADD              Knee flexion/extension  L x20 5'' hold          DW TX - hang in the deep water              DW ABD/ADD  2            DW Bicycle  6            DW Scissor hip flexion/extension  2            DW DKTC            Stretches              HS at step  30''ea x2            Wall stretches              Calf stretch at wall              Other: knee lunge on step x2ea          Hot Tub - 10 minutes only  10                 TherEx 4/9 4/21           P ed/HEP             Bike  5'           TKE quad set on foam 20x 3\" 20x           SLR 3# 2x10 3# 3x10 ea           TKE ball on wall 10x            SL ABD  10x            clamshell  Blue 30x ea           Step up  6\" 20x ea           Lateral step up  4\" 20 ea           Leg press  70# 30 DL  30# SL           Ham curl  15# 30x           Partial squat with KB  10#KB 20x                                                                                                                                                          Ther Activity                                       Gait Training                                       Modalities                                              "

## 2025-04-23 ENCOUNTER — OFFICE VISIT (OUTPATIENT)
Dept: PHYSICAL THERAPY | Age: 30
End: 2025-04-23
Attending: PHYSICIAN ASSISTANT
Payer: COMMERCIAL

## 2025-04-23 DIAGNOSIS — Z98.890 S/P LEFT KNEE ARTHROSCOPY: ICD-10-CM

## 2025-04-23 DIAGNOSIS — T84.82XA CYCLOPS LESION OF LEFT KNEE: Primary | ICD-10-CM

## 2025-04-23 DIAGNOSIS — M24.662 CYCLOPS LESION OF LEFT KNEE: Primary | ICD-10-CM

## 2025-04-23 PROCEDURE — 97110 THERAPEUTIC EXERCISES: CPT | Performed by: PHYSICAL THERAPIST

## 2025-04-23 PROCEDURE — 97110 THERAPEUTIC EXERCISES: CPT

## 2025-04-23 NOTE — PROGRESS NOTES
Daily Note     Today's date: 2025  Patient name: Lovely Trevino  : 1995  MRN: [id number]  Referring provider: Rachana Castle,*  Dx:   Encounter Diagnosis     ICD-10-CM    1. Cyclops lesion of left knee  M24.662     T84.82XA       2. S/P left knee arthroscopy  Z98.890           Start Time: 1545  Stop Time: 1630  Total time in clinic (min): 45 minutes    Subjective: Pt reports she had only minimal soreness after last session and resolved with tylenol.       Objective: See treatment diary below      Assessment: Pt tolerated session well, fatigue noted with quad and glute exercises. Pt able to perform exercises with good form and technique.       Plan: Pt will transition to independent fitness due to coinsurance.        POC expires Unit limit Auth Expiration date PT/OT + Visit Limit?   25 BOMN  $65 copay 25 12 approved (45)                           Visit/Unit Tracking  AUTH Status:  Date            Auth needed Used 1 2 3 4            Remaining  11 10 9 8            FOTO                     Precautions: standard    Access Code: [email or web address]  URL: [email or web address]  Date: 2025  Prepared By: Nunu Lopez     Program Notes        Exercises      - Supine Quadricep Sets - 1 x daily - 7 x weekly - 3 sets - 10 reps      - Active Straight Leg Raise with Quad Set - 1 x daily - 7 x weekly - 3 sets - 10 reps      - Sidelying Hip Abduction - 1 x daily - 7 x weekly - 3 sets - 10 reps      - Standing Terminal Knee Extension at Wall with Ball - 1 x daily - 7 x weekly - 3 sets - 10 reps      - Mini Squat with Counter Support - 1 x daily - 7 x weekly - 3 sets - 10 reps  Precautions:  Daily Treatment Diary     Date           Patient education 5'          Water Walk (FW, BW, monster, side to side) 5' f/b, 2' mon, 2' ss          LE work at wall               Hip Ext  2'            Toe/heel  1'            Hip ABD/ADD  2'             W/ opp UE reach 1'             "Knee flexion & extension 1'            Squats 1'          SL squats x5ea          UE noodle x3             Push/pull              Rotate              Row forward & back              OH side bend            Agility ex's           Hopscotch 2 laps          Single leg jumps small to other foot 2 laps          Fast feet in/out 2 laps            Push pull              Single paddle rotation           SLS (EO, EC, ball toss)            Aqua Step (FW, lat, eccentric)  L for 1', lat 1'          Core work:              MF press (red, blue, blk)             Kickboard press (blue, red)              Row/ext w/ tubing (red, blue, blk)           Seated on bench             ankle DF/PF              March              ABD/ADD              Knee flexion/extension  L x20 5'' hold          DW TX - hang in the deep water              DW ABD/ADD  2            DW Bicycle  6            DW Scissor hip flexion/extension  2            DW DKTC            Stretches              HS at step  30''ea x2            Wall stretches              Calf stretch at wall              Other: knee lunge on step x2ea          Hot Tub - 10 minutes only  10                 TherEx 4/9 4/21 4/23          P ed/HEP             Bike  5'           TKE quad set on foam 20x 3\" 20x           SLR 3# 2x10 3# 3x10 ea 3# 30x          TKE ball on wall 10x            SL ABD  10x            clamshell  Blue 30x ea 30x          Step up  6\" 20x ea 20x          Lateral step up  4\" 20 ea 20x          Leg press  70# 30 DL  30# SL 90# DL  50# SL          Ham curl  15# 30x 15# 30x          Partial squat with KB  10#KB 20x 10# 20x                       Hip ABD   30#  30x          Hip ADD   30# 30x                       Lateral walk with band   Blk 5x          Lunge   10x                                                                           Ther Activity                                       Gait Training                                       Modalities                                "

## 2025-05-13 ENCOUNTER — OFFICE VISIT (OUTPATIENT)
Dept: OBGYN CLINIC | Facility: MEDICAL CENTER | Age: 30
End: 2025-05-13

## 2025-05-13 ENCOUNTER — TELEPHONE (OUTPATIENT)
Age: 30
End: 2025-05-13

## 2025-05-13 VITALS — WEIGHT: 244 LBS | BODY MASS INDEX: 39.21 KG/M2 | HEIGHT: 66 IN

## 2025-05-13 DIAGNOSIS — T84.82XA CYCLOPS LESION OF LEFT KNEE: Primary | ICD-10-CM

## 2025-05-13 DIAGNOSIS — M24.662 CYCLOPS LESION OF LEFT KNEE: Primary | ICD-10-CM

## 2025-05-13 PROCEDURE — 99024 POSTOP FOLLOW-UP VISIT: CPT | Performed by: ORTHOPAEDIC SURGERY

## 2025-05-13 NOTE — PROGRESS NOTES
Knee Post Operative Visit     Assesment:     Surgical Arthroscopy of the left knee with cyclops excision    Plan:    Ice to knee for 20 minutes at least 1-2 times daily.  PT for ROM/strengthening to knee, hip and core.  Transition to home exercise program with help of physical therapist    History of Present Illness:    The patient is a 29 y.o. female who is being evaluated post operatively 7 week  status post cyclops excision left knee.    Since the prior visit, She reports minimal improvement.     She is doing well with improvement since prior to surgery.  Her knee is able to get all the way straight and she is doing physical therapy as well.  She reports no injury or further concerns.    I have reviewed the past medical, surgical, social and family history, medications and allergies as documented in the EMR.    Review of systems: ROS is negative other than that noted in the HPI.  Constitutional: Negative for fatigue and fever.        Physical Exam:    not currently breastfeeding.    General/Constitutional: NAD, well developed, well nourished  HENT: Normocephalic, atraumatic  CV: Intact distal pulses, regular rate  Resp: No respiratory distress or labored breathing  Lymphatic: No lymphadenopathy palpated  Neuro: Alert and Oriented x 3, no focal deficits  Psych: Normal mood, normal affect, normal judgement, normal behavior  Skin: Warm, dry, no rashes, no erythema       Knee Exam (focused):                  RIGHT LEFT   ROM:   0-130 0-130   Palpation: Effusion negative negative     MJL tenderness Negative Negative     LJL tenderness Negative Negative   Instability: Varus stable stable     Valgus stable stable   Special Tests: Lachman Negative Negative     Posterior drawer Negative Negative     Anterior drawer Negative Negative     Pivot shift not tested not tested     Dial not tested not tested   Patella: Palpation no tenderness no tenderness     Mobility 1/4 1/4     Apprehension Negative Negative   Other: Single  leg 1/4 squat not tested not tested      Incisions show no erythema, no drainage    LE NV Exam: +2 DP/PT pulses bilaterally  Sensation intact to light touch L2-S1 bilaterally     Bilateral hip ROM demonstrates no pain actively or passively    No calf tenderness to palpation bilaterally

## 2025-05-13 NOTE — TELEPHONE ENCOUNTER
Patient would be new. Needs a dating/viability. She states she has irregular periods and doesn't know when she had it last. She said maybe February.

## 2025-05-14 ENCOUNTER — APPOINTMENT (EMERGENCY)
Dept: ULTRASOUND IMAGING | Facility: HOSPITAL | Age: 30
End: 2025-05-14
Payer: COMMERCIAL

## 2025-05-14 ENCOUNTER — HOSPITAL ENCOUNTER (EMERGENCY)
Facility: HOSPITAL | Age: 30
Discharge: HOME | End: 2025-05-14
Attending: EMERGENCY MEDICINE
Payer: COMMERCIAL

## 2025-05-14 VITALS
OXYGEN SATURATION: 97 % | HEART RATE: 88 BPM | BODY MASS INDEX: 39.38 KG/M2 | DIASTOLIC BLOOD PRESSURE: 62 MMHG | WEIGHT: 244 LBS | RESPIRATION RATE: 18 BRPM | SYSTOLIC BLOOD PRESSURE: 109 MMHG | TEMPERATURE: 97.8 F

## 2025-05-14 DIAGNOSIS — O23.40 UTI IN PREGNANCY: ICD-10-CM

## 2025-05-14 DIAGNOSIS — Z32.01 POSITIVE PREGNANCY TEST: Primary | ICD-10-CM

## 2025-05-14 DIAGNOSIS — O46.90 VAGINAL BLEEDING IN PREGNANCY: ICD-10-CM

## 2025-05-14 LAB
ABO GROUP BLD: NORMAL
ALBUMIN SERPL BCG-MCNC: 4.3 G/DL (ref 3.5–5)
ALP SERPL-CCNC: 55 U/L (ref 34–104)
ALT SERPL W P-5'-P-CCNC: 15 U/L (ref 7–52)
ANION GAP SERPL CALCULATED.3IONS-SCNC: 6 MMOL/L (ref 4–13)
AST SERPL W P-5'-P-CCNC: 11 U/L (ref 13–39)
B-HCG SERPL-ACNC: 14.7 MIU/ML (ref 0–5)
BACTERIA UR QL AUTO: ABNORMAL /HPF
BASOPHILS # BLD AUTO: 0.05 THOUSANDS/ÂΜL (ref 0–0.1)
BASOPHILS NFR BLD AUTO: 1 % (ref 0–1)
BILIRUB SERPL-MCNC: 0.57 MG/DL (ref 0.2–1)
BILIRUB UR QL STRIP: NEGATIVE
BUN SERPL-MCNC: 12 MG/DL (ref 5–25)
CALCIUM SERPL-MCNC: 8.8 MG/DL (ref 8.4–10.2)
CHLORIDE SERPL-SCNC: 105 MMOL/L (ref 96–108)
CLARITY UR: CLEAR
CO2 SERPL-SCNC: 27 MMOL/L (ref 21–32)
COLOR UR: ABNORMAL
CREAT SERPL-MCNC: 0.8 MG/DL (ref 0.6–1.3)
EOSINOPHIL # BLD AUTO: 0.26 THOUSAND/ÂΜL (ref 0–0.61)
EOSINOPHIL NFR BLD AUTO: 2 % (ref 0–6)
ERYTHROCYTE [DISTWIDTH] IN BLOOD BY AUTOMATED COUNT: 12.9 % (ref 11.6–15.1)
EXT PREGNANCY TEST URINE: NEGATIVE
EXT. CONTROL: NORMAL
FLUAV AG UPPER RESP QL IA.RAPID: NEGATIVE
FLUBV AG UPPER RESP QL IA.RAPID: NEGATIVE
GFR SERPL CREATININE-BSD FRML MDRD: 98 ML/MIN/1.73SQ M
GLUCOSE SERPL-MCNC: 79 MG/DL (ref 65–140)
GLUCOSE UR STRIP-MCNC: NEGATIVE MG/DL
HCT VFR BLD AUTO: 40.8 % (ref 34.8–46.1)
HGB BLD-MCNC: 13.6 G/DL (ref 11.5–15.4)
HGB UR QL STRIP.AUTO: ABNORMAL
IMM GRANULOCYTES # BLD AUTO: 0.06 THOUSAND/UL (ref 0–0.2)
IMM GRANULOCYTES NFR BLD AUTO: 1 % (ref 0–2)
KETONES UR STRIP-MCNC: NEGATIVE MG/DL
LEUKOCYTE ESTERASE UR QL STRIP: ABNORMAL
LYMPHOCYTES # BLD AUTO: 2.56 THOUSANDS/ÂΜL (ref 0.6–4.47)
LYMPHOCYTES NFR BLD AUTO: 24 % (ref 14–44)
MCH RBC QN AUTO: 28 PG (ref 26.8–34.3)
MCHC RBC AUTO-ENTMCNC: 33.3 G/DL (ref 31.4–37.4)
MCV RBC AUTO: 84 FL (ref 82–98)
MONOCYTES # BLD AUTO: 0.76 THOUSAND/ÂΜL (ref 0.17–1.22)
MONOCYTES NFR BLD AUTO: 7 % (ref 4–12)
NEUTROPHILS # BLD AUTO: 7.09 THOUSANDS/ÂΜL (ref 1.85–7.62)
NEUTS SEG NFR BLD AUTO: 65 % (ref 43–75)
NITRITE UR QL STRIP: NEGATIVE
NON-SQ EPI CELLS URNS QL MICRO: ABNORMAL /HPF
NRBC BLD AUTO-RTO: 0 /100 WBCS
PH UR STRIP.AUTO: 6.5 [PH]
PLATELET # BLD AUTO: 259 THOUSANDS/UL (ref 149–390)
PMV BLD AUTO: 10.4 FL (ref 8.9–12.7)
POTASSIUM SERPL-SCNC: 3.7 MMOL/L (ref 3.5–5.3)
PROT SERPL-MCNC: 7 G/DL (ref 6.4–8.4)
PROT UR STRIP-MCNC: ABNORMAL MG/DL
RBC # BLD AUTO: 4.85 MILLION/UL (ref 3.81–5.12)
RBC #/AREA URNS AUTO: ABNORMAL /HPF
RH BLD: POSITIVE
SARS-COV+SARS-COV-2 AG RESP QL IA.RAPID: NEGATIVE
SODIUM SERPL-SCNC: 138 MMOL/L (ref 135–147)
SP GR UR STRIP.AUTO: 1.02 (ref 1–1.03)
UROBILINOGEN UR STRIP-ACNC: <2 MG/DL
WBC # BLD AUTO: 10.78 THOUSAND/UL (ref 4.31–10.16)
WBC #/AREA URNS AUTO: ABNORMAL /HPF

## 2025-05-14 PROCEDURE — 87811 SARS-COV-2 COVID19 W/OPTIC: CPT | Performed by: EMERGENCY MEDICINE

## 2025-05-14 PROCEDURE — 86901 BLOOD TYPING SEROLOGIC RH(D): CPT | Performed by: EMERGENCY MEDICINE

## 2025-05-14 PROCEDURE — 36415 COLL VENOUS BLD VENIPUNCTURE: CPT | Performed by: EMERGENCY MEDICINE

## 2025-05-14 PROCEDURE — 86900 BLOOD TYPING SEROLOGIC ABO: CPT | Performed by: EMERGENCY MEDICINE

## 2025-05-14 PROCEDURE — 76815 OB US LIMITED FETUS(S): CPT

## 2025-05-14 PROCEDURE — 87086 URINE CULTURE/COLONY COUNT: CPT | Performed by: EMERGENCY MEDICINE

## 2025-05-14 PROCEDURE — 99284 EMERGENCY DEPT VISIT MOD MDM: CPT

## 2025-05-14 PROCEDURE — 87804 INFLUENZA ASSAY W/OPTIC: CPT | Performed by: EMERGENCY MEDICINE

## 2025-05-14 PROCEDURE — 81001 URINALYSIS AUTO W/SCOPE: CPT | Performed by: EMERGENCY MEDICINE

## 2025-05-14 PROCEDURE — 85025 COMPLETE CBC W/AUTO DIFF WBC: CPT | Performed by: EMERGENCY MEDICINE

## 2025-05-14 PROCEDURE — 80053 COMPREHEN METABOLIC PANEL: CPT | Performed by: EMERGENCY MEDICINE

## 2025-05-14 PROCEDURE — 81025 URINE PREGNANCY TEST: CPT | Performed by: EMERGENCY MEDICINE

## 2025-05-14 PROCEDURE — 84702 CHORIONIC GONADOTROPIN TEST: CPT | Performed by: EMERGENCY MEDICINE

## 2025-05-14 RX ORDER — CEPHALEXIN 500 MG/1
500 CAPSULE ORAL EVERY 8 HOURS SCHEDULED
Qty: 21 CAPSULE | Refills: 0 | Status: SHIPPED | OUTPATIENT
Start: 2025-05-14 | End: 2025-05-21

## 2025-05-14 RX ADMIN — CEPHALEXIN 500 MG: 250 CAPSULE ORAL at 22:05

## 2025-05-15 ENCOUNTER — TELEPHONE (OUTPATIENT)
Age: 30
End: 2025-05-15

## 2025-05-15 ENCOUNTER — RESULTS FOLLOW-UP (OUTPATIENT)
Dept: EMERGENCY DEPT | Facility: HOSPITAL | Age: 30
End: 2025-05-15

## 2025-05-15 LAB — BACTERIA UR CULT: NORMAL

## 2025-05-15 NOTE — ED PROVIDER NOTES
Time reflects when diagnosis was documented in both MDM as applicable and the Disposition within this note       Time User Action Codes Description Comment    2025  9:57 PM Isis Jimenez Add [Z32.01] Positive pregnancy test     2025  9:57 PM Isis Jimenez Add [O46.90] Vaginal bleeding in pregnancy     2025  9:58 PM Isis Jimenez Add [O23.40] UTI in pregnancy           ED Disposition       ED Disposition   Discharge    Condition   Stable    Date/Time   Wed May 14, 2025  9:57 PM    Comment   Lovely Trevino discharge to home/self care.                   Assessment & Plan       Medical Decision Making  28 y/o female with vaginal bleeding in pregnancy- will get labs, urine pregnancy, UA, hcg quant, and type and screen. Will also get US to r/o ectopic.     Will cover for uti with keflex. Will instruct follow up with ob and return precautions given     Amount and/or Complexity of Data Reviewed  Labs: ordered. Decision-making details documented in ED Course.  Radiology: ordered.    Risk  Prescription drug management.        ED Course as of 05/14/25 2259   Wed May 14, 2025   1908 Had a positive pregnancy test on Saturday. Unknown last mensdtrual cycle- very irregular. Did have a neg pregnancy test on . Last time she had any vaginal bleeding was fe. Nausea.  vaginal spotting and now bleeding with clots dime size. Now going through a pad an 1.5 hours. Felt feverish today. Abd crmaping x few days and worse today. Typical menstrual cycle is worse then this. Has appointment on 2023 Rh Factor: Positive    HCG QUANTITATIVE(!): 14.7       Medications   cephalexin (KEFLEX) capsule 500 mg (500 mg Oral Given 25)       ED Risk Strat Scores                    No data recorded        SBIRT 22yo+      Flowsheet Row Most Recent Value   Initial Alcohol Screen: US AUDIT-C     1. How often do you have a drink containing alcohol? 0 Filed at: 2025 1858   2. How many  drinks containing alcohol do you have on a typical day you are drinking?  0 Filed at: 05/14/2025 1858   3b. FEMALE Any Age, or MALE 65+: How often do you have 4 or more drinks on one occassion? 0 Filed at: 05/14/2025 1858   Audit-C Score 0 Filed at: 05/14/2025 1858   JORGE: How many times in the past year have you...    Used an illegal drug or used a prescription medication for non-medical reasons? Never Filed at: 05/14/2025 1858                            History of Present Illness       Chief Complaint   Patient presents with    Pregnancy Problem     States she had positive pregnancy test earlier this month and she started spotting today     Vaginal Bleeding - Pregnant     States spotting began earlier today and bled through 1 pad today        Past Medical History:   Diagnosis Date    Anesthesia complication     pt has hx of waking up during procedures, if this happens she wakes up combative/violent    Stomach ulcer     Vaginal yeast infection       Past Surgical History:   Procedure Laterality Date    COLONOSCOPY  12/2016    Dr Phoenix. Diarrhea and melena.  Scope advanced to hepatic flexure, unable to advance ascending colon due to looping.  Transverse colon, descending colon and sigmoid colon and rectum were significant for mild patchy colitis and proctitis, no pathology.    EGD  12/2016    Healing esophageal ulcer, esophagitis, biopsy showed hyperplastic squamous mucosa showing nonspecific patchy and mild chronic inflammation features consistent with GERD.    MENISCECTOMY Left 3/21/2025    Procedure: MENISCECTOMY LATERAL /MEDIAL;  Surgeon: Papa Kaminski DO;  Location: [facility];  Service: Orthopedics    NM ARTHROSCOPY KNEE SYNOVECTOMY LIMITED SPX Left 3/21/2025    Procedure: ARTHROSCOPY KNEE;  Surgeon: Papa Kaminski DO;  Location: [facility];  Service: Orthopedics    NM ARTHRS AIDED ANT CRUCIATE LIGM RPR/AGMNTJ/RCNSTJ Left 8/1/2024    Procedure: ARTHROSCOPIC RECONSTRUCTION ANTERIOR CRUCIATE LIGAMENT (ACL)  WITH AUTOGRAFT- quadiceps autografts;  Surgeon: Papa Kaminski DO;  Location: [facility];  Service: Orthopedics    WA ARTHRS KNE SURG W/MENISCECTOMY MED/LAT W/SHVG Left 2024    Procedure: MENISCECTOMY LATERAL /MEDIAL;  Surgeon: Papa Kaminski DO;  Location: [facility];  Service: Orthopedics    THUMB ARTHROSCOPY      UPPER GASTROINTESTINAL ENDOSCOPY      WISDOM TOOTH EXTRACTION N/A       Family History   Problem Relation Age of Onset    Lung cancer Father     Prostate cancer Father     Hypothyroidism Father     Colon polyps Father       Social History[1]   E-Cigarette/Vaping    E-Cigarette Use Never User       E-Cigarette/Vaping Substances    Nicotine No     THC No     CBD No     Flavoring No     Other No     Unknown No       I have reviewed and agree with the history as documented.     30 y/o female  currently unknown gestation, presents to the ED for vaginal bleeding since this morning. She states that she has irregular periods and is unsure when she last had her menstrual cycle. States that she took a pregnancy test at home 5 days ago which was positive. She states that she has an appointment with OB in . States that over the last few days she has had lower abd cramping which worsened today. States that today she developed brown vaginal spotting which progressed to heavier vaginal bleeding with dime sized clots. She denies any urinary symptoms, states that her menstrual cycles are typically heavier then the bleeding today.       History provided by:  Patient  Vaginal Bleeding  Quality:  Bright red  Severity:  Moderate  Onset quality:  Sudden  Timing:  Constant  Progression:  Worsening  Chronicity:  New  Menstrual history:  Missed period  Possible pregnancy: yes    Context: spontaneously    Relieved by:  None tried  Worsened by:  Nothing  Ineffective treatments:  None tried  Associated symptoms: abdominal pain    Associated symptoms: no dysuria, no fever and no nausea        Review of Systems    Constitutional:  Negative for chills and fever.   HENT:  Negative for congestion, ear pain and sore throat.    Eyes:  Negative for pain and visual disturbance.   Respiratory:  Negative for cough, shortness of breath and wheezing.    Cardiovascular:  Negative for chest pain and leg swelling.   Gastrointestinal:  Positive for abdominal pain. Negative for diarrhea, nausea and vomiting.   Genitourinary:  Positive for vaginal bleeding. Negative for dysuria, frequency, hematuria and urgency.   Musculoskeletal:  Negative for neck pain and neck stiffness.   Skin:  Negative for rash and wound.   Neurological:  Negative for weakness, numbness and headaches.   Psychiatric/Behavioral:  Negative for agitation and confusion.    All other systems reviewed and are negative.          Objective       ED Triage Vitals   Temperature Pulse Blood Pressure Respirations SpO2 Patient Position - Orthostatic VS   05/14/25 1850 05/14/25 1850 05/14/25 1850 05/14/25 1850 05/14/25 1850 05/14/25 2145   97.8 °F (36.6 °C) 90 136/78 18 100 % Sitting      Temp src Heart Rate Source BP Location FiO2 (%) Pain Score    -- 05/14/25 1930 05/14/25 2145 -- --     Monitor Left arm        Vitals      Date and Time Temp Pulse SpO2 Resp BP Pain Score FACES Pain Rating User   05/14/25 2145 -- 88 97 % 18 109/62 -- -- TO   05/14/25 2000 -- 89 96 % 18 101/67 -- -- TE   05/14/25 1930 -- 83 98 % 18 105/57 -- -- TE   05/14/25 1850 97.8 °F (36.6 °C) 90 100 % 18 136/78 -- -- AM            Physical Exam  Vitals and nursing note reviewed.   Constitutional:       Appearance: She is well-developed.   HENT:      Head: Normocephalic and atraumatic.     Eyes:      Pupils: Pupils are equal, round, and reactive to light.       Cardiovascular:      Rate and Rhythm: Normal rate and regular rhythm.   Pulmonary:      Effort: Pulmonary effort is normal.      Breath sounds: Normal breath sounds.   Abdominal:      General: Bowel sounds are normal.      Palpations: Abdomen is soft.       Tenderness: There is no abdominal tenderness.     Musculoskeletal:         General: Normal range of motion.      Cervical back: Normal range of motion and neck supple.     Skin:     General: Skin is warm and dry.     Neurological:      General: No focal deficit present.      Mental Status: She is alert and oriented to person, place, and time.      Comments: No focal deficits       Results Reviewed       Procedure Component Value Units Date/Time    hCG, quantitative [[id number]]  (Abnormal) Collected: 05/14/25 1926    Lab Status: Final result Specimen: Blood from Arm, Right Updated: 05/14/25 2001     HCG, Quant 14.7 mIU/mL     Narrative:       Expected Ranges:    HCG results between 5.0 and 25.0 mIU/mL may be indicative of early pregnancy but should be interpreted in light of the total clinical presentation.    HCG can rise to detectable levels in ramona and post menopausal women (0-11.6 mIU/mL).     Approximate               Approximate HCG  Gestation age          Concentration ( mIU/mL)  _____________          ______________________   Weeks                      HCG values  0.2-1                       5-50  1-2                           2-3                         100-5000  3-4                         500-46367  4-5                         1000-25020  5-6                         [id number]  6-8                         [id number]  8-12                        [id number]      Comprehensive metabolic panel [[id number]]  (Abnormal) Collected: 05/14/25 1926    Lab Status: Final result Specimen: Blood from Arm, Right Updated: 05/14/25 1957     Sodium 138 mmol/L      Potassium 3.7 mmol/L      Chloride 105 mmol/L      CO2 27 mmol/L      ANION GAP 6 mmol/L      BUN 12 mg/dL      Creatinine 0.80 mg/dL      Glucose 79 mg/dL      Calcium 8.8 mg/dL      AST 11 U/L      ALT 15 U/L      Alkaline Phosphatase 55 U/L      Total Protein 7.0 g/dL      Albumin 4.3 g/dL      Total Bilirubin 0.57 mg/dL      eGFR 98 ml/min/1.73sq m      Narrative:      National Kidney Disease Foundation guidelines for Chronic Kidney Disease (CKD):     Stage 1 with normal or high GFR (GFR > 90 mL/min/1.73 square meters)    Stage 2 Mild CKD (GFR = 60-89 mL/min/1.73 square meters)    Stage 3A Moderate CKD (GFR = 45-59 mL/min/1.73 square meters)    Stage 3B Moderate CKD (GFR = 30-44 mL/min/1.73 square meters)    Stage 4 Severe CKD (GFR = 15-29 mL/min/1.73 square meters)    Stage 5 End Stage CKD (GFR <15 mL/min/1.73 square meters)  Note: GFR calculation is accurate only with a steady state creatinine    FLU/COVID Rapid Antigen (30 min. TAT) - Preferred screening test in ED [[id number]]  (Normal) Collected: 05/14/25 1926    Lab Status: Final result Specimen: Nares from Nose Updated: 05/14/25 1951     SARS COV Rapid Antigen Negative     Influenza A Rapid Antigen Negative     Influenza B Rapid Antigen Negative    Narrative:      This test has been performed using the Quidel Indira 2 FLU+SARS Antigen test under the Emergency Use Authorization (EUA). This test has been validated by the  and verified by the performing laboratory. The Indira uses lateral flow immunofluorescent sandwich assay to detect SARS-COV, Influenza A and Influenza B Antigen.     The Quidel Indira 2 SARS Antigen test does not differentiate between SARS-CoV and SARS-CoV-2.     Negative results are presumptive and may be confirmed with a molecular assay, if necessary, for patient management. Negative results do not rule out SARS-CoV-2 or influenza infection and should not be used as the sole basis for treatment or patient management decisions. A negative test result may occur if the level of antigen in a sample is below the limit of detection of this test.     Positive results are indicative of the presence of viral antigens, but do not rule out bacterial infection or co-infection with other viruses.     All test results should be used as an adjunct to clinical observations and other information  available to the provider.    FOR PEDIATRIC PATIENTS - copy/paste COVID Guidelines URL to browser: [email or web address]    Urine Microscopic [[id number]]  (Abnormal) Collected: 05/14/25 1926    Lab Status: Final result Specimen: Urine, Clean Catch Updated: 05/14/25 1941     RBC, UA Innumerable /hpf      WBC, UA 10-20 /hpf      Epithelial Cells Occasional /hpf      Bacteria, UA Occasional /hpf     Urine culture [[id number]] Collected: 05/14/25 1926    Lab Status: In process Specimen: Urine, Clean Catch Updated: 05/14/25 1941    UA w Reflex to Microscopic w Reflex to Culture [[id number]]  (Abnormal) Collected: 05/14/25 1926    Lab Status: Final result Specimen: Urine, Clean Catch Updated: 05/14/25 1940     Color, UA Light Orange     Clarity, UA Clear     Specific Gravity, UA 1.020     pH, UA 6.5     Leukocytes, UA Small     Nitrite, UA Negative     Protein, UA Trace mg/dl      Glucose, UA Negative mg/dl      Ketones, UA Negative mg/dl      Urobilinogen, UA <2.0 mg/dl      Bilirubin, UA Negative     Occult Blood, UA Large    CBC and differential [[id number]]  (Abnormal) Collected: 05/14/25 1926    Lab Status: Final result Specimen: Blood from Arm, Right Updated: 05/14/25 1935     WBC 10.78 Thousand/uL      RBC 4.85 Million/uL      Hemoglobin 13.6 g/dL      Hematocrit 40.8 %      MCV 84 fL      MCH 28.0 pg      MCHC 33.3 g/dL      RDW 12.9 %      MPV 10.4 fL      Platelets 259 Thousands/uL      nRBC 0 /100 WBCs      Segmented % 65 %      Immature Grans % 1 %      Lymphocytes % 24 %      Monocytes % 7 %      Eosinophils Relative 2 %      Basophils Relative 1 %      Absolute Neutrophils 7.09 Thousands/µL      Absolute Immature Grans 0.06 Thousand/uL      Absolute Lymphocytes 2.56 Thousands/µL      Absolute Monocytes 0.76 Thousand/µL      Eosinophils Absolute 0.26 Thousand/µL      Basophils Absolute 0.05 Thousands/µL     POCT pregnancy, urine [[id number]]  (Normal)  Collected: 25    Lab Status: Final result Updated: 25     EXT Preg Test, Ur Negative     Control Valid            US OB pregnancy limited with transvaginal   Final Interpretation by Rajani Givens MD ([id number])      No evidence of an intrauterine gestation. No adnexal mass visualized.      Given beta-hCG level, this represents a pregnancy of unknown location, the differential diagnosis for which includes an early IUP, a nonvisualized ectopic pregnancy, or a completed spontaneous . Recommend correlation with serial beta-hCG levels    and repeat pelvic ultrasound as warranted.      Unremarkable sonographic appearance of the ovaries.      No free pelvic fluid.                        Workstation performed: [id number]             Procedures    ED Medication and Procedure Management   Prior to Admission Medications   Prescriptions Last Dose Informant Patient Reported? Taking?   Bioflavonoid Products (BIOFLEX PO)   Yes No   Sig: Take by mouth   FAMOTIDINE PO   Yes No   Sig: Take by mouth if needed   Ibuprofen 200 MG CAPS   Yes No   Multiple Vitamin (MULTIVITAMIN PO)   Yes No   Sig: Take by mouth   Omeprazole Magnesium (PRILOSEC PO)   Yes No   Sig: Take by mouth if needed   acetaminophen (TYLENOL) 500 mg tablet   Yes No   Sig: Take 500 mg by mouth every 6 (six) hours as needed for mild pain   aspirin (ECOTRIN LOW STRENGTH) 81 mg EC tablet   No No   Sig: Take 1 tablet (81 mg total) by mouth 2 (two) times a day for 21 days   Patient not taking: Reported on 2025   multivitamin (THERAGRAN) TABS   Yes No   Sig: Take 1 tablet by mouth daily   norgestimate-ethinyl estradiol (ORTHO TRI-CYCLEN LO) 0.18/0.215/0.25 MG-25 MCG per tablet   No No   Sig: Take 1 tablet by mouth daily   nystatin (MYCOSTATIN) powder   No No   Sig: APPLY TO RED AREA BETWEEN BREASTS 3x PER DAY UNTIL HEALING IS COMPLETE   oxyCODONE (ROXICODONE) 5 immediate release tablet   No No   Sig: Take 1 tablet (5 mg total) by mouth  every 4 (four) hours as needed for moderate pain for up to 15 doses Max Daily Amount: 30 mg   Patient not taking: Reported on 5/13/2025      Facility-Administered Medications: None     Discharge Medication List as of 5/14/2025 10:03 PM        START taking these medications    Details   cephalexin (KEFLEX) 500 mg capsule Take 1 capsule (500 mg total) by mouth every 8 (eight) hours for 7 days, Starting Wed 5/14/2025, Until Wed 5/21/2025, Normal           CONTINUE these medications which have NOT CHANGED    Details   acetaminophen (TYLENOL) 500 mg tablet Take 500 mg by mouth every 6 (six) hours as needed for mild pain, Historical Med      aspirin (ECOTRIN LOW STRENGTH) 81 mg EC tablet Take 1 tablet (81 mg total) by mouth 2 (two) times a day for 21 days, Starting Fri 3/21/2025, Until Fri 4/11/2025, Normal      Bioflavonoid Products (BIOFLEX PO) Take by mouth, Historical Med      FAMOTIDINE PO Take by mouth if needed, Historical Med      Ibuprofen 200 MG CAPS Historical Med      !! Multiple Vitamin (MULTIVITAMIN PO) Take by mouth, Historical Med      !! multivitamin (THERAGRAN) TABS Take 1 tablet by mouth daily, Historical Med      norgestimate-ethinyl estradiol (ORTHO TRI-CYCLEN LO) 0.18/0.215/0.25 MG-25 MCG per tablet Take 1 tablet by mouth daily, Starting Wed 2/14/2018, Normal      nystatin (MYCOSTATIN) powder APPLY TO RED AREA BETWEEN BREASTS 3x PER DAY UNTIL HEALING IS COMPLETE, Normal      Omeprazole Magnesium (PRILOSEC PO) Take by mouth if needed, Historical Med      oxyCODONE (ROXICODONE) 5 immediate release tablet Take 1 tablet (5 mg total) by mouth every 4 (four) hours as needed for moderate pain for up to 15 doses Max Daily Amount: 30 mg, Starting Fri 3/21/2025, Normal       !! - Potential duplicate medications found. Please discuss with provider.        No discharge procedures on file.  ED SEPSIS DOCUMENTATION   Time reflects when diagnosis was documented in both MDM as applicable and the Disposition within  this note       Time User Action Codes Description Comment    5/14/2025  9:57 PM Isis Jimenez Add [Z32.01] Positive pregnancy test     5/14/2025  9:57 PM Isis Jimenez Add [O46.90] Vaginal bleeding in pregnancy     5/14/2025  9:58 PM Isis Jimenez Add [O23.40] UTI in pregnancy                      [1]   Social History  Tobacco Use    Smoking status: Never    Smokeless tobacco: Never   Vaping Use    Vaping status: Never Used   Substance Use Topics    Alcohol use: Not Currently     Alcohol/week: 1.0 - 2.0 standard drink of alcohol     Types: 1 - 2 Standard drinks or equivalent per week    Drug use: Yes     Comment: CBD roll on- over a month        Isis Jimenez DO  05/14/25 2222

## 2025-05-15 NOTE — TELEPHONE ENCOUNTER
New patient with unknown LMP. Has not established care with office - has D&V scheduled for 6/6/25. Had UPT 5/7. Seen in ED yesterday for vaginal bleeding that started on Saturday. HCG 14.7. Was told to follow-up with obgyn office for repeat HCG and follow-up. HCG was not ordered by ED. No sooner appointments in office than already scheduled visit on 6/6. Bleeding precautions were reviewed with patient and understands when to proceed back to ED.    ESC sent to Dr. Martinez. No additional recommendations as patient has not established care.     Outgoing call to patient, informed of same. Patient is requesting to schedule appointment at [facility] ob[facility] office with soonest availability to establish care to initiate follow-up.     Appointment scheduled for 5/21 per patient request.

## 2025-06-05 ENCOUNTER — OFFICE VISIT (OUTPATIENT)
Age: 30
End: 2025-06-05

## 2025-06-05 ENCOUNTER — APPOINTMENT (OUTPATIENT)
Age: 30
End: 2025-06-05
Payer: COMMERCIAL

## 2025-06-05 VITALS
WEIGHT: 244.6 LBS | SYSTOLIC BLOOD PRESSURE: 110 MMHG | DIASTOLIC BLOOD PRESSURE: 64 MMHG | BODY MASS INDEX: 39.31 KG/M2 | HEIGHT: 66 IN

## 2025-06-05 DIAGNOSIS — Z32.01 POSITIVE PREGNANCY TEST: ICD-10-CM

## 2025-06-05 DIAGNOSIS — O46.90 VAGINAL BLEEDING IN PREGNANCY: Primary | ICD-10-CM

## 2025-06-05 DIAGNOSIS — O46.90 VAGINAL BLEEDING IN PREGNANCY: ICD-10-CM

## 2025-06-05 LAB
B-HCG SERPL-ACNC: <0.6 MIU/ML (ref 0–5)
SL AMB POCT URINE HCG: NORMAL

## 2025-06-05 PROCEDURE — 84702 CHORIONIC GONADOTROPIN TEST: CPT

## 2025-06-05 PROCEDURE — 36415 COLL VENOUS BLD VENIPUNCTURE: CPT

## 2025-06-05 NOTE — PROGRESS NOTES
"Name: Lovely Trevino      : 1995      MRN: [id number]  Encounter Provider: Jennie Ojeda DO  Encounter Date: 2025   Encounter department: [facility] [place]  :  Assessment & Plan  Positive pregnancy test  UPT in office negative  Orders:    POCT urine HCG    hCG, quantitative; Future    Vaginal bleeding in pregnancy  Reviewed hx with pt and partner today - suspect SAB based on the history   Upt negative in office today  Will check beta hcg to confirm given the low level of beta in the ED on - if still positive, would recommend trending with rpt u/s to rule out ectopic  Will f/u results- RTO for annual exam     Orders:    POCT urine HCG    hCG, quantitative; Future        History of Present Illness     HPI    Lovely Trevino is a 29 y.o.   who presents today for ER follow up.     She is accompanied by her partner today.   She notes that she had a +urine pregnancy test ( 9 of them) at home on .       start with bleeding and cramping  Had passage of tissue and clot  Was heavier than normal period with passage of \"material\".   Went to the ER for eval given the positive pregnancy douglas at home.  Had beta hcg- 14.7, neg UPT in ED    Pelvic u/s   FINDINGS:     UTERUS:  The uterus is anteverted in position and normal in size, measuring 7.5 x 3.2 x 4.3 cm.  Contour and echotexture appear normal.  The cervix shows no suspicious abnormality.     ENDOMETRIUM:  Normal caliber of 5 mm.  Homogeneous and normal in appearance. No evidence of an intrauterine gestation.     OVARIES/ADNEXA:  The right ovary measures 3.0 x 2.2 x 2.1 cm, with a volume of 7.3 mL.  The left ovary measures 2.9 x 1.7 x 1.8 cm, with a volume of 4.8 mL.  No suspicious ovarian abnormality. Follicles are seen in both ovaries.  Ovarian Doppler flow within normal limits.     No suspicious adnexal mass or loculated collections.     There is no free pelvic fluid.     IMPRESSION:   "   No evidence of an intrauterine gestation. No adnexal mass visualized.     Given beta-hCG level, this represents a pregnancy of unknown location, the differential diagnosis for which includes an early IUP, a nonvisualized ectopic pregnancy, or a completed spontaneous . Recommend correlation with serial beta-hCG levels   and repeat pelvic ultrasound as warranted.     Unremarkable sonographic appearance of the ovaries.     No free pelvic fluid.    After this bled for 3-4 days. And then resolved.     Has been SA since this.   Not using any condoms or contraceptives.     Periods are always irregular.   Thinks may be getting periods maybe every 3 months.   Has no estimate of when her LMP was.     Has been years since gyn visit-   Had tried to regulate menses in the past with OCP- had migraines and weight gain. This was more than 5 years ago.       History obtained from: patient    Review of Systems  Negative unless otherwise noted in HPI.     Past Medical History   Past Medical History[1]  Past Surgical History[2]  Family History[3]   reports that she has never smoked. She has never used smokeless tobacco. She reports current alcohol use of about 1.0 - 2.0 standard drink of alcohol per week. She reports that she does not currently use drugs.  Current Outpatient Medications   Medication Instructions    acetaminophen (TYLENOL) 500 mg, Every 6 hours PRN    aspirin (ECOTRIN LOW STRENGTH) 81 mg, Oral, 2 times daily    Bioflavonoid Products (BIOFLEX PO) Oral    FAMOTIDINE PO As needed    Ibuprofen 200 MG CAPS     Multiple Vitamin (MULTIVITAMIN PO) Take by mouth    multivitamin (THERAGRAN) TABS 1 tablet, Oral, Daily    norgestimate-ethinyl estradiol (ORTHO TRI-CYCLEN LO) 0.18/0.215/0.25 MG-25 MCG per tablet 1 tablet, Oral, Daily    nystatin (MYCOSTATIN) powder APPLY TO RED AREA BETWEEN BREASTS 3x PER DAY UNTIL HEALING IS COMPLETE    Omeprazole Magnesium (PRILOSEC PO) As needed    oxyCODONE (ROXICODONE) 5 mg, Oral,  "Every 4 hours PRN   Allergies[4]      Objective   /64 (BP Location: Left arm, Patient Position: Sitting, Cuff Size: Large)   Ht 5' 6\" (1.676 m)   Wt 111 kg (244 lb 9.6 oz)   BMI 39.48 kg/m²      Physical Exam  Constitutional:       General: She is not in acute distress.     Appearance: She is obese.   HENT:      Head: Normocephalic and atraumatic.      Mouth/Throat:      Mouth: Mucous membranes are moist.     Cardiovascular:      Rate and Rhythm: Normal rate.   Pulmonary:      Effort: Pulmonary effort is normal. No respiratory distress.   Abdominal:      General: There is no distension.      Tenderness: There is abdominal tenderness (mild tenderness suprapubic region). There is no guarding or rebound.   Genitourinary:     Comments: Deferred today    Skin:     General: Skin is warm and dry.     Neurological:      Mental Status: She is alert.     Psychiatric:         Mood and Affect: Mood normal.         Behavior: Behavior normal.                [1]   Past Medical History:  Diagnosis Date    Anesthesia complication     pt has hx of waking up during procedures, if this happens she wakes up combative/violent    Obesity     Stomach disorder 09/2016    Stomach ulcer     Urinary tract infection     Vaginal yeast infection    [2]   Past Surgical History:  Procedure Laterality Date    COLONOSCOPY  12/2016    Dr Phoenix. Diarrhea and melena.  Scope advanced to hepatic flexure, unable to advance ascending colon due to looping.  Transverse colon, descending colon and sigmoid colon and rectum were significant for mild patchy colitis and proctitis, no pathology.    EGD  12/2016    Healing esophageal ulcer, esophagitis, biopsy showed hyperplastic squamous mucosa showing nonspecific patchy and mild chronic inflammation features consistent with GERD.    HAND SURGERY  1998    Right thumb    MENISCECTOMY Left 03/21/2025    Procedure: MENISCECTOMY LATERAL /MEDIAL;  Surgeon: Papa Kaminski DO;  Location: [facility];  Service: " Orthopedics    NJ ARTHROSCOPY KNEE SYNOVECTOMY LIMITED SPX Left 03/21/2025    Procedure: ARTHROSCOPY KNEE;  Surgeon: Papa Kaminski DO;  Location: [facility];  Service: Orthopedics    NJ ARTHRS AIDED ANT CRUCIATE LIGM RPR/AGMNTJ/RCNSTJ Left 08/01/2024    Procedure: ARTHROSCOPIC RECONSTRUCTION ANTERIOR CRUCIATE LIGAMENT (ACL) WITH AUTOGRAFT- quadiceps autografts;  Surgeon: Papa Kaminski DO;  Location: [facility];  Service: Orthopedics    NJ ARTHRS KNE SURG W/MENISCECTOMY MED/LAT W/SHVG Left 08/01/2024    Procedure: MENISCECTOMY LATERAL /MEDIAL;  Surgeon: Papa Kaminski DO;  Location: [facility];  Service: Orthopedics    THUMB ARTHROSCOPY      UPPER GASTROINTESTINAL ENDOSCOPY      WISDOM TOOTH EXTRACTION N/A    [3]   Family History  Problem Relation Name Age of Onset    Lung cancer Father Avery Abdul     Prostate cancer Father Avery Abdul     Hypothyroidism Father Avery Abdul     Colon polyps Father Avery Martiness     Cancer Father Avery Martiness         Prostate cancer and then lung cancer    Clotting disorder Father Avery Abdul         Stomach aneurysm    Scoliosis Father Averytrish Abdul         Degenerative Disc Disease    Vascular Disease Father Averytrish Abdul         High blood pressure/hypothyroid    Cancer Maternal Grandfather Dante Trevino         Mesothelioma    Cancer Maternal Grandmother Tomasa Breitlauch         Mesothelioma    Clotting disorder Paternal Grandmother Joce Beers         Aneurysm   [4] No Known Allergies

## 2025-06-06 ENCOUNTER — RESULTS FOLLOW-UP (OUTPATIENT)
Dept: OBGYN CLINIC | Facility: CLINIC | Age: 30
End: 2025-06-06

## 2025-06-18 ENCOUNTER — OFFICE VISIT (OUTPATIENT)
Dept: OBGYN CLINIC | Facility: CLINIC | Age: 30
End: 2025-06-18
Payer: COMMERCIAL

## 2025-06-18 VITALS — SYSTOLIC BLOOD PRESSURE: 122 MMHG | DIASTOLIC BLOOD PRESSURE: 76 MMHG | BODY MASS INDEX: 40.19 KG/M2 | WEIGHT: 249 LBS

## 2025-06-18 DIAGNOSIS — N92.6 IRREGULAR MENSES: ICD-10-CM

## 2025-06-18 DIAGNOSIS — B37.49 CANDIDA INFECTION OF GENITAL REGION: ICD-10-CM

## 2025-06-18 DIAGNOSIS — R10.2 PELVIC PAIN: Primary | ICD-10-CM

## 2025-06-18 DIAGNOSIS — R11.0 NAUSEA: ICD-10-CM

## 2025-06-18 PROCEDURE — 99213 OFFICE O/P EST LOW 20 MIN: CPT | Performed by: NURSE PRACTITIONER

## 2025-06-18 RX ORDER — MICONAZOLE NITRATE 2 %
1 CREAM WITH APPLICATOR VAGINAL
Qty: 45 G | Refills: 0 | Status: SHIPPED | OUTPATIENT
Start: 2025-06-18 | End: 2025-06-25

## 2025-06-18 NOTE — PATIENT INSTRUCTIONS
"Patient Education     Vulvovaginal yeast infection   The Basics   Written by the doctors and editors at [facility]   What is a vulvovaginal yeast infection? -- This is an infection that causes itching and irritation of the vulva, the outer lips of the vagina (figure 1). The infection is usually caused by a fungus called \"Candida.\" (Yeast are a type of fungus.)  What causes yeast infections? -- The fungus that causes yeast infections normally lives in the vagina and the gut. Even though the yeast are there, they do not usually cause symptoms. Certain medicines (especially antibiotics), stress, and other things can cause the fungus to grow more than it should. When that happens, a yeast infection can start.  Your risk of getting a yeast infection is higher if you:   Have diabetes   Are pregnant   Have a weaker-than-normal immune system  Yeast infections are not usually spread through sex.  What are the symptoms of a yeast infection? -- Symptoms include:   Itching of the vulva (this is the most common symptom)   Pain, redness, or irritation of the vulva and vagina   Pain when you urinate   Pain during sex   Abnormal vaginal discharge, which might be thick and white or thin and watery  The symptoms of a yeast infection are a lot like the symptoms of many other conditions. The best way to find out what is causing your symptoms is to see your doctor or nurse. This way, you can get the right treatment.  Is there a test for yeast infection? -- Yes. Your doctor or nurse will use a swab to get a sample of fluid from your vagina. Then, they will put it under a microscope and look for the fungus that causes yeast infections. Sometimes, they might do a test to find out which type of yeast you have.  Depending on your situation, your doctor or nurse might do other tests on your vaginal fluid, too. One common test checks for yeast infections as well as bacterial vaginosis and trichomoniasis. These are other infections that can also " cause itching and irritation.  How are yeast infections treated? -- Yeast infections are treated with medicines. All medicines for yeast infections work by killing the fungus that causes the infections. They come in different forms:   A pill you take by mouth   Medicines you put in your vagina and on your vulva - These come as creams or tablets. This is the preferred treatment for pregnant people.  When will I feel better? -- You will probably feel better within a few days of starting treatment. If you do not get better after you finish treatment, see your doctor or nurse again. You might need to take more medicine or a different medicine.  What if I get yeast infections often? -- Tell your doctor or nurse. They can help find out whether your symptoms are caused by a yeast infection and, if so, which type of yeast. There are a few different types of yeast, and they respond to different treatments. Plus, the same symptoms that you get with a yeast infection can sometimes be caused by other types of infections, an allergy, or other problems. If you get frequent infections, you might need a different treatment than what you tried in the past.  When should I call the doctor? -- Call your doctor or nurse for advice if your symptoms do not get better after treatment. It might take up to a week for symptoms to improve.  All topics are updated as new evidence becomes available and our peer review process is complete.  This topic retrieved from [employer] on: Feb 26, 2024.  Topic 53450 Version 14.0  Release: 32.2.4 - C32.56  © 2024 UpToDate, Inc. and/or its affiliates. All rights reserved.  figure 1: Adult female external genitalia     This drawing shows the different parts of the genitals.  Graphic 10042 Version 9.0  Consumer Information Use and Disclaimer   Disclaimer: This generalized information is a limited summary of diagnosis, treatment, and/or medication information. It is not meant to be comprehensive and should be  used as a tool to help the user understand and/or assess potential diagnostic and treatment options. It does NOT include all information about conditions, treatments, medications, side effects, or risks that may apply to a specific patient. It is not intended to be medical advice or a substitute for the medical advice, diagnosis, or treatment of a health care provider based on the health care provider's examination and assessment of a patient's specific and unique circumstances. Patients must speak with a health care provider for complete information about their health, medical questions, and treatment options, including any risks or benefits regarding use of medications. This information does not endorse any treatments or medications as safe, effective, or approved for treating a specific patient. UpToDate, Inc. and its affiliates disclaim any warranty or liability relating to this information or the use thereof.The use of this information is governed by the Terms of Use, available at https://www.[employer].com/en/know/clinical-effectiveness-terms. 2024© UpToDate, Inc. and its affiliates and/or licensors. All rights reserved.  Copyright   © 2024 UpToDate, Inc. and/or its affiliates. All rights reserved.

## 2025-06-18 NOTE — PROGRESS NOTES
Name: Lovely Trevino      : 1995      MRN: [id number]  Encounter Provider: TAWNYA Webster  Encounter Date: 2025   Encounter department: [facility] [place]  :  Assessment & Plan  Pelvic pain  Encouraged to start a PNV.  Orders:    US pelvis complete w transvaginal; Future    Irregular menses    Orders:    17-Hydroxyprogesterone; Future    Hemoglobin A1C; Future    DHEA-sulfate; Future    Prolactin; Future    Follicle stimulating hormone; Future    Estradiol; Future    Testosterone, Free and Weakly Bound; Future    TSH, 3rd generation with Free T4 reflex; Future    Nausea    Orders:    Ambulatory Referral to Gastroenterology; Future    Candida infection of genital region    Orders:    miconazole (MONISTAT-7) 2 % vaginal cream; Insert 1 applicator into the vagina daily at bedtime for 7 days        History of Present Illness   HPI  Lovely Trevino is a 29 y.o. female who presents for pelvic pain concerns.  She had a miscarriage in May 2025 and this is when her pain started.  Her pain does wax and wane.  She denies vaginal odor but does have discharge and itching.  She also has a history of irregular menses.  She states she has never been evaluated for these but can skip more than 3 months sometimes.  She is not actively pursuing pregnancy but would be okay with it if it were to occur.  Patient has been having daily nausea, even prior to the miscarriage and would like to see GI for this.      Review of Systems   Constitutional:  Negative for chills, fatigue, fever and unexpected weight change.   Respiratory:  Negative for shortness of breath.    Gastrointestinal:  Positive for nausea (Chronic). Negative for anal bleeding, blood in stool, constipation and diarrhea.   Genitourinary:  Positive for vaginal discharge. Negative for difficulty urinating, dysuria and hematuria.   Neurological:  Negative for weakness.   Psychiatric/Behavioral:  Negative for  agitation, behavioral problems and confusion.           Objective   /76 (BP Location: Left arm, Patient Position: Sitting, Cuff Size: Large)   Wt 113 kg (249 lb)   LMP  (LMP Unknown)   BMI 40.19 kg/m²      Physical Exam  Constitutional:       General: She is not in acute distress.     Appearance: She is well-developed. She is not diaphoretic.   Abdominal:      Palpations: Abdomen is soft.      Hernia: There is no hernia in the left inguinal area.   Genitourinary:     Labia:         Right: No rash, tenderness, lesion or injury.         Left: No rash, tenderness, lesion or injury.       Vagina: No signs of injury and foreign body. Vaginal discharge (Yeasty discharge noted) present. No erythema, tenderness or bleeding.      Cervix: No cervical motion tenderness or friability.      Uterus: Not deviated, not enlarged, not fixed and not tender.       Adnexa:         Right: No mass, tenderness or fullness.          Left: No mass, tenderness or fullness.       Psychiatric:         Behavior: Behavior is cooperative.

## 2025-06-28 ENCOUNTER — HOSPITAL ENCOUNTER (OUTPATIENT)
Dept: ULTRASOUND IMAGING | Facility: HOSPITAL | Age: 30
Discharge: HOME | End: 2025-06-28
Attending: NURSE PRACTITIONER
Payer: COMMERCIAL

## 2025-06-28 DIAGNOSIS — R10.2 PELVIC PAIN: ICD-10-CM

## 2025-06-28 PROCEDURE — 76830 TRANSVAGINAL US NON-OB: CPT

## 2025-06-28 PROCEDURE — 76856 US EXAM PELVIC COMPLETE: CPT

## 2025-07-08 ENCOUNTER — RESULTS FOLLOW-UP (OUTPATIENT)
Dept: OBGYN CLINIC | Facility: CLINIC | Age: 30
End: 2025-07-08

## 2025-07-08 DIAGNOSIS — E28.1 ANDROGEN EXCESS: Primary | ICD-10-CM

## 2025-07-08 NOTE — RESULT ENCOUNTER NOTE
Please let patient know her ultrasound was normal except for possible appearance of PCOS on her ovaries.  She should get the labs drawn that were ordered on her last visit to confirm the diagnoses. Thanks

## 2025-07-10 ENCOUNTER — APPOINTMENT (OUTPATIENT)
Dept: LAB | Facility: CLINIC | Age: 30
End: 2025-07-10
Payer: COMMERCIAL

## 2025-07-10 DIAGNOSIS — N92.6 IRREGULAR MENSES: ICD-10-CM

## 2025-07-10 PROCEDURE — 36415 COLL VENOUS BLD VENIPUNCTURE: CPT

## 2025-07-10 PROCEDURE — 83498 ASY HYDROXYPROGESTERONE 17-D: CPT

## 2025-07-10 PROCEDURE — 83036 HEMOGLOBIN GLYCOSYLATED A1C: CPT

## 2025-07-10 PROCEDURE — 84146 ASSAY OF PROLACTIN: CPT

## 2025-07-10 PROCEDURE — 82627 DEHYDROEPIANDROSTERONE: CPT

## 2025-07-10 PROCEDURE — 82670 ASSAY OF TOTAL ESTRADIOL: CPT

## 2025-07-10 PROCEDURE — 84410 TESTOSTERONE BIOAVAILABLE: CPT

## 2025-07-10 PROCEDURE — 84443 ASSAY THYROID STIM HORMONE: CPT

## 2025-07-10 PROCEDURE — 83001 ASSAY OF GONADOTROPIN (FSH): CPT

## 2025-07-11 LAB
EST. AVERAGE GLUCOSE BLD GHB EST-MCNC: 108 MG/DL
ESTRADIOL SERPL-MCNC: 32.9 PG/ML
FSH SERPL-ACNC: 4.6 MIU/ML
HBA1C MFR BLD: 5.4 %
PROLACTIN SERPL-MCNC: 18.25 NG/ML (ref 3.34–26.72)
TSH SERPL DL<=0.05 MIU/L-ACNC: 2.06 UIU/ML (ref 0.45–4.5)

## 2025-07-12 LAB — DHEA-S SERPL-MCNC: 573 UG/DL (ref 84.8–378)

## 2025-07-14 LAB — 17OHP SERPL-MCNC: 67 NG/DL

## 2025-07-15 LAB
DEPRECATED TESTOST FREE FR SERPL: 32.5 NG/DL (ref 0–9.5)
TESTOST SERPL-MCNC: 85 NG/DL (ref 8–60)
TESTOSTERONE.FREE+WB MFR SERPL: 38.2 % (ref 3–18)

## 2025-07-18 ENCOUNTER — TELEPHONE (OUTPATIENT)
Dept: OTHER | Facility: OTHER | Age: 30
End: 2025-07-18

## 2025-07-18 NOTE — TELEPHONE ENCOUNTER
"Patient stated, \"I was referred to Endocrinology and would like to schedule an appointment.\"    Attempt was made by Ben Nelson on 7/15/25. Please call patient to schedule. Thank you.  "

## 2025-07-22 NOTE — TELEPHONE ENCOUNTER
Called patient back to schedule from referral for Endocrinology. No answer. Left Message for patient to call back and schedule consult

## (undated) DEVICE — SUTURETAPE FIBERLOOP W/NDL WHITE/BL AR-7534

## (undated) DEVICE — TUBING ARTHROSCOPIC WAVE  MAIN PUMP

## (undated) DEVICE — BLADE SHAVER DISSECTOR 4MM 13CM COOLCUT

## (undated) DEVICE — SURGICAL GOWN, XL SMARTSLEEVE: Brand: CONVERTORS

## (undated) DEVICE — ABDOMINAL PAD: Brand: DERMACEA

## (undated) DEVICE — GLOVE SRG BIOGEL 6.5

## (undated) DEVICE — CUFF TOURNIQUET 30 X 4 IN QUICK CONNECT DISP 1BLA

## (undated) DEVICE — 3M™ STERI-STRIP™ REINFORCED ADHESIVE SKIN CLOSURES, R1547, 1/2 IN X 4 IN (12 MM X 100 MM), 6 STRIPS/ENVELOPE: Brand: 3M™ STERI-STRIP™

## (undated) DEVICE — BETHLEHEM UNIVERSAL  ARTHRO PK: Brand: CARDINAL HEALTH

## (undated) DEVICE — GLOVE SRG BIOGEL ECLIPSE 6.5

## (undated) DEVICE — STIRRUP STRAP ADULT DISP

## (undated) DEVICE — NEEDLE 25G X 1 1/2

## (undated) DEVICE — DRAPE SHEET THREE QUARTER

## (undated) DEVICE — HARVESTER QUADPRO 9MM

## (undated) DEVICE — OCCLUSIVE GAUZE STRIP,3% BISMUTH TRIBROMOPHENATE IN PETROLATUM BLEND: Brand: XEROFORM

## (undated) DEVICE — PUDDLE VAC

## (undated) DEVICE — COBAN 6 IN STERILE

## (undated) DEVICE — FAST-FIX 360 STRAIGHT KNOT                                    PUSHER/CUTTER AND SLOTTED CANNULA SETS: Brand: FAST-FIX

## (undated) DEVICE — DISPOSABLE OR TOWEL: Brand: CARDINAL HEALTH

## (undated) DEVICE — SUT FIBERSTICK #2 50IN BLUE

## (undated) DEVICE — 3M™ IOBAN™ 2 ANTIMICROBIAL INCISE DRAPE 6650EZ: Brand: IOBAN™ 2

## (undated) DEVICE — GLOVE INDICATOR PI UNDERGLOVE SZ 8.5 BLUE

## (undated) DEVICE — PROBE ABLATION  APOLLO RF 90 DEG MULTI PORT

## (undated) DEVICE — 3M™ STERI-DRAPE™ U-DRAPE 1015: Brand: STERI-DRAPE™

## (undated) DEVICE — SUT TIGERSTICK TIGERWIRE 50IN WHITE/BLACK

## (undated) DEVICE — PLUMEPEN PRO 10FT

## (undated) DEVICE — EXTREMITY DRAPE W/ARMBOARD COVERS: Brand: CONVERTORS

## (undated) DEVICE — SUT VICRYL 2-0 SH 27 IN UNDYED J417H

## (undated) DEVICE — SUT VICRYL 0 CT-1 36 IN J946H

## (undated) DEVICE — HARVESTER QUADPRO 10MM

## (undated) DEVICE — ARTHROSCOPY FLOOR MAT

## (undated) DEVICE — SUT MONOCRYL 4-0 PS-2 27 IN Y426H

## (undated) DEVICE — GAUZE SPONGES,16 PLY: Brand: CURITY

## (undated) DEVICE — BRUSH EZ SCRUB PCMX W/NAIL CLEANER

## (undated) DEVICE — PADDING CAST 6IN COTTON STRL

## (undated) DEVICE — ACE WRAP 6 IN UNSTERILE

## (undated) DEVICE — GLOVE SRG BIOGEL 8.5

## (undated) DEVICE — CANNULA BUTTON 10 X 30MM PASSPORT

## (undated) DEVICE — GLOVE INDICATOR PI UNDERGLOVE SZ 7 BLUE

## (undated) DEVICE — IMPERVIOUS STOCKINETTE: Brand: DEROYAL

## (undated) DEVICE — INTENDED FOR TISSUE SEPARATION, AND OTHER PROCEDURES THAT REQUIRE A SHARP SURGICAL BLADE TO PUNCTURE OR CUT.: Brand: BARD-PARKER ® CARBON RIB-BACK BLADES

## (undated) DEVICE — SYRINGE 30ML LL

## (undated) DEVICE — MEDI-VAC YANKAUER SUCTION HANDLE W/BULBOUS AND CONTROL VENT: Brand: CARDINAL HEALTH

## (undated) DEVICE — CHLORAPREP HI-LITE 26ML ORANGE

## (undated) DEVICE — MAT ABSORBANT ARTHROSCOPY FLOOR 46 X 40 IN

## (undated) DEVICE — FAST-FIX 360 CURVED MENISCAL                                    REPAIR SYSTEM
Type: IMPLANTABLE DEVICE | Site: KNEE | Status: NON-FUNCTIONAL
Brand: FAST-FIX
Removed: 2024-08-01

## (undated) DEVICE — CUTTER FLIPCUTTER III 6-12MM

## (undated) DEVICE — EXOFIN PRECISION PEN HIGH VISCOSITY TOPICAL SKIN ADHESIVE: Brand: EXOFIN PRECISION PEN, 1G

## (undated) DEVICE — BLADE SHAVER DISSECTOR  4MM 13CM CRV COOLCUT